# Patient Record
Sex: FEMALE | Employment: UNEMPLOYED | URBAN - METROPOLITAN AREA
[De-identification: names, ages, dates, MRNs, and addresses within clinical notes are randomized per-mention and may not be internally consistent; named-entity substitution may affect disease eponyms.]

---

## 2017-03-20 PROBLEM — F51.01 PRIMARY INSOMNIA: Status: ACTIVE | Noted: 2017-03-20

## 2017-03-20 PROBLEM — F41.9 ANXIETY: Status: ACTIVE | Noted: 2017-03-20

## 2017-03-22 PROBLEM — R73.01 IMPAIRED FASTING GLUCOSE: Status: ACTIVE | Noted: 2017-03-22

## 2017-07-07 PROBLEM — M54.50 ACUTE LEFT-SIDED LOW BACK PAIN WITHOUT SCIATICA: Status: ACTIVE | Noted: 2017-07-07

## 2017-09-15 PROBLEM — M18.0 ARTHRITIS OF CARPOMETACARPAL (CMC) JOINTS OF BOTH THUMBS: Status: ACTIVE | Noted: 2017-09-15

## 2017-11-06 PROBLEM — Z90.710 ACQUIRED ABSENCE OF BOTH CERVIX AND UTERUS: Status: ACTIVE | Noted: 2017-11-06

## 2018-02-20 PROBLEM — M54.50 ACUTE LEFT-SIDED LOW BACK PAIN WITHOUT SCIATICA: Status: RESOLVED | Noted: 2017-07-07 | Resolved: 2018-02-20

## 2018-11-15 ENCOUNTER — OFFICE VISIT (OUTPATIENT)
Dept: INTERNAL MEDICINE CLINIC | Facility: CLINIC | Age: 53
End: 2018-11-15
Payer: COMMERCIAL

## 2018-11-15 VITALS
HEIGHT: 63 IN | TEMPERATURE: 98 F | BODY MASS INDEX: 25.52 KG/M2 | DIASTOLIC BLOOD PRESSURE: 74 MMHG | WEIGHT: 144 LBS | SYSTOLIC BLOOD PRESSURE: 100 MMHG | HEART RATE: 64 BPM

## 2018-11-15 DIAGNOSIS — Z00.00 ROUTINE HEALTH MAINTENANCE: Primary | ICD-10-CM

## 2018-11-15 DIAGNOSIS — Z12.31 ENCOUNTER FOR SCREENING MAMMOGRAM FOR BREAST CANCER: ICD-10-CM

## 2018-11-15 DIAGNOSIS — E03.9 ACQUIRED HYPOTHYROIDISM: ICD-10-CM

## 2018-11-15 DIAGNOSIS — R92.2 DENSE BREASTS: ICD-10-CM

## 2018-11-15 DIAGNOSIS — R73.01 IMPAIRED FASTING GLUCOSE: ICD-10-CM

## 2018-11-15 DIAGNOSIS — Z78.0 POSTMENOPAUSE: ICD-10-CM

## 2018-11-15 PROCEDURE — 90471 IMMUNIZATION ADMIN: CPT | Performed by: INTERNAL MEDICINE

## 2018-11-15 PROCEDURE — 90686 IIV4 VACC NO PRSV 0.5 ML IM: CPT | Performed by: INTERNAL MEDICINE

## 2018-11-15 PROCEDURE — 99386 PREV VISIT NEW AGE 40-64: CPT | Performed by: INTERNAL MEDICINE

## 2018-11-15 NOTE — PROGRESS NOTES
Mine Krueger is a 46year old female. Patient presents with:  Establish Care      HPI:   Mine Krueger is a 46year old female new pt who presents for a complete physical exam.  Here to establish. Referred by her friend Dr. Mcmahon Covert.    Previo D & C  2001    missed ab   • ENDOMETRIAL ABLATION  9/30/10    Thermachoice III   • ESOPHAGOGASTRODUODENOSCOPY, COLONOSCOPY, POSSIBLE BIOPSY, POSSIBLE POLYPECTOMY 14689, 68804 N/A 12/20/2011    Performed by Andrés Gonsales MD at [address] [place] [address]   • normal oropharynx, normal TM's  EYES: PERRLA, EOMI, conjunctivae are pink  NECK: supple, no cervical or supraclavicular LAD, no carotid bruits  BREAST: no dominant or suspicious mass, no axillary LAD  LUNGS: clear to auscultation  CARDIO: RRR, normal S1S2,

## 2018-11-16 ENCOUNTER — TELEPHONE (OUTPATIENT)
Dept: INTERNAL MEDICINE CLINIC | Facility: CLINIC | Age: 53
End: 2018-11-16

## 2018-11-16 DIAGNOSIS — Z00.00 ROUTINE ADULT HEALTH MAINTENANCE: Primary | ICD-10-CM

## 2018-11-16 NOTE — TELEPHONE ENCOUNTER
Orders entered per Dr. Guanaco Pitts. Left voicemail relaying the message along with instructions to fast for 12 hours prior to blood draw. Encouraged the patient to call back with any questions or concerns. Nothing further at this time.

## 2018-11-16 NOTE — TELEPHONE ENCOUNTER
Pt is calling she saw Dr Maira Diamond yesterday, she was told Dr Maira Diamond would order lab work nothing has been ordered yet  Please call pt when complete [phone]    Tasked to nursing

## 2018-11-16 NOTE — TELEPHONE ENCOUNTER
Per Dr. Barajas Ask 11/15/18 office visit note:  Routine health maintenance  Pt is s/p HAYDEN/BSO. Mammo normal 11/2017; Rx done for repeat mammo. Check DEXA. Colonoscopy normal 12/20/11 (Dr. Jassi Merino); next in 2021. Rec Shingrix shingles vaccine.  Tdap d

## 2018-11-19 ENCOUNTER — APPOINTMENT (OUTPATIENT)
Dept: LAB | Age: 53
End: 2018-11-19
Attending: INTERNAL MEDICINE
Payer: COMMERCIAL

## 2018-11-19 DIAGNOSIS — Z00.00 ROUTINE ADULT HEALTH MAINTENANCE: ICD-10-CM

## 2018-11-19 PROCEDURE — 82947 ASSAY GLUCOSE BLOOD QUANT: CPT

## 2018-11-19 PROCEDURE — 36415 COLL VENOUS BLD VENIPUNCTURE: CPT

## 2018-11-19 PROCEDURE — 80061 LIPID PANEL: CPT

## 2018-11-19 PROCEDURE — 83036 HEMOGLOBIN GLYCOSYLATED A1C: CPT

## 2018-11-19 NOTE — TELEPHONE ENCOUNTER
Pt does not required a prior auth from [facility] for mammogram. Pt only requires an order.

## 2018-11-24 ENCOUNTER — HOSPITAL ENCOUNTER (OUTPATIENT)
Dept: BONE DENSITY | Age: 53
Discharge: HOME | End: 2018-11-24
Attending: INTERNAL MEDICINE
Payer: COMMERCIAL

## 2018-11-24 ENCOUNTER — HOSPITAL ENCOUNTER (OUTPATIENT)
Dept: MAMMOGRAPHY | Age: 53
Discharge: HOME | End: 2018-11-24
Attending: INTERNAL MEDICINE
Payer: COMMERCIAL

## 2018-11-24 DIAGNOSIS — R92.2 DENSE BREASTS: ICD-10-CM

## 2018-11-24 DIAGNOSIS — Z78.0 POSTMENOPAUSE: ICD-10-CM

## 2018-11-24 DIAGNOSIS — Z12.31 ENCOUNTER FOR SCREENING MAMMOGRAM FOR BREAST CANCER: ICD-10-CM

## 2018-11-24 PROCEDURE — 77063 BREAST TOMOSYNTHESIS BI: CPT | Performed by: INTERNAL MEDICINE

## 2018-11-24 PROCEDURE — 77080 DXA BONE DENSITY AXIAL: CPT | Performed by: INTERNAL MEDICINE

## 2018-11-24 PROCEDURE — 77067 SCR MAMMO BI INCL CAD: CPT | Performed by: INTERNAL MEDICINE

## 2018-11-27 ENCOUNTER — TELEPHONE (OUTPATIENT)
Dept: INTERNAL MEDICINE CLINIC | Facility: CLINIC | Age: 53
End: 2018-11-27

## 2018-11-27 NOTE — TELEPHONE ENCOUNTER
Pt callling to discuss lab results from 11/19, pt is unsure how to read the results from my chart  Please call [phone]    Tasked to nursing

## 2018-11-28 NOTE — TELEPHONE ENCOUNTER
Please advise - DR. COLLINS - patient called who states she had TSH done a couple months ago - ( DR. Nunez Serve endocrinologist) She will let them know to fax results over - fax number given to patient.  Patient  Wants to lose weight  Nothing  is working , she is exer

## 2018-11-28 NOTE — TELEPHONE ENCOUNTER
Lipid profile is normal.  LDL is NOT high (normal is actually <130). HgbA1c is normal, no diabetes. TSH was not done even though I am looking at the order in [address] [place] [address] Rd. This was an oops by the lab. Can pt pretty please get it done?   Please call the lab and

## 2018-11-28 NOTE — TELEPHONE ENCOUNTER
Called patient and relayed DR. COLLINS message - verbalized understanding. Address and number for DR. Torres given to patient

## 2019-01-22 ENCOUNTER — TELEPHONE (OUTPATIENT)
Dept: INTERNAL MEDICINE CLINIC | Facility: CLINIC | Age: 54
End: 2019-01-22

## 2019-01-22 NOTE — TELEPHONE ENCOUNTER
To Dr. Sandie Almodovar - fell on Sunday, landed on R elbow which is bruised, head fell back hit driveway - pt states she just felt woozy afterward,  Denies headache, nausea, blurred vision, dizziness.   Advised ER for CT scan of head for possible bleed - pt would

## 2019-01-22 NOTE — TELEPHONE ENCOUNTER
It really depends on how she feels. She is not on any blood thinners. If she has no sx of dizziness, visual changes, nausea or headache currently, then I think it's okay to hold off on CT.   If she does have any of the those sx, then yes I advise ER for C

## 2019-01-22 NOTE — TELEPHONE ENCOUNTER
Slipped on the ice over the weekend and hit her head   Would like call back from nursing to advise [phone]

## 2019-01-22 NOTE — TELEPHONE ENCOUNTER
Dr. Sandie Almodovar' message relayed to pt who verbalized understanding. Pt will self monitor, if she notices any changes she will go to ER.

## 2019-02-06 NOTE — TELEPHONE ENCOUNTER
I would at least like the CT report. She may have post-concussion syndrome. Have her see me next week if not better.

## 2019-02-06 NOTE — TELEPHONE ENCOUNTER
S/w patient who state initially she did not have a headache post fall. Just had some soreness to head. Yesterday she developed a dull headache to top RT side of the head. It lasted a couple seconds. Today she developed the same headache.  Denies vomiting, n

## 2019-02-06 NOTE — TELEPHONE ENCOUNTER
Pt called as follow up to fall  Yesterday pt felt some pain and on head and then happened again today  Tasked to nursing

## 2019-02-06 NOTE — TELEPHONE ENCOUNTER
Patient called back. She wants to know if she should be following any restrictions. Is it ok for her to work out like normal? Drive? Do her chores?   She has been taking motrin prn for pain    To Dr Susu Ocampo

## 2019-02-06 NOTE — TELEPHONE ENCOUNTER
Patient called back. She did not listen to the message yet. I relayed Dr Casey Quezada message. She verbalized understanding.

## 2019-02-06 NOTE — TELEPHONE ENCOUNTER
As FYI to DR. COLLINS - called patient who will have report of CT ( done at [facility]) faxed over , Relayed rest of DR. COLLINS message and she verbalized understanding

## 2019-02-06 NOTE — TELEPHONE ENCOUNTER
Called patient and relayed Dr Rae Martinez message on VM per hipaa. I did ask that patient call back to confirm she received the message.

## 2019-02-06 NOTE — TELEPHONE ENCOUNTER
Pt calling back she had the CT scan done results came out to be normal asking if she need the results and also if a follow up is needed when does she need to come in.

## 2019-02-07 NOTE — TELEPHONE ENCOUNTER
Pt called to notify office that CT results were faxed to office  Results rec'd and placed in Dr Laly Cotto in-box  Please call pt with any questions  Tasked to nursing

## 2019-02-07 NOTE — TELEPHONE ENCOUNTER
Reviewed CT results. Reports states, \"Impression: No acute intracranial abnormalities are demonstrated. \"    To Dr. Kathrin Magallanes. Report in your inbox on your desk.

## 2019-02-13 NOTE — TELEPHONE ENCOUNTER
To Dr. Cally Lebron, your message relayed, pt feeling a bit better. Pt concerned - when is she clear, out of the woods? Looked on-line which stated sx can persist and worsen over time.

## 2019-05-06 ENCOUNTER — TELEPHONE (OUTPATIENT)
Dept: INTERNAL MEDICINE CLINIC | Facility: CLINIC | Age: 54
End: 2019-05-06

## 2019-05-06 DIAGNOSIS — Z01.84 IMMUNITY STATUS TESTING: Primary | ICD-10-CM

## 2019-05-06 NOTE — TELEPHONE ENCOUNTER
Pt is calling with requesting a call back from a nurse, pt is wondering if she needs a booster shot. Pt is not sure when she had her last one, pt came from the [facility] when she was 8years old.     Best call back: [phone]

## 2019-05-06 NOTE — TELEPHONE ENCOUNTER
To Dr. Dominick Dempsey - pt asking if she needs MMR booster since is unable to remember if she had both MMR shots. Pt came here in [address] when she was 6 and entered school here.   If MMR needed pt will need order - her insurance wants her to have immunization at lo

## 2019-05-08 NOTE — TELEPHONE ENCOUNTER
Pt thanks us for the call. If she can not recall the date he will have titer drawn. Prior to this she would also like to call [facility] to see where she can draw labs. Pt will call back when ready.

## 2019-05-09 ENCOUNTER — TELEPHONE (OUTPATIENT)
Dept: INTERNAL MEDICINE CLINIC | Facility: CLINIC | Age: 54
End: 2019-05-09

## 2019-05-09 NOTE — TELEPHONE ENCOUNTER
Spoke with patient. She reports intermittent tingling to L hand and L foot that started about 2 weeks ago. She mostly notices the tingling when she is lying in bed, but not when she is up and about.  She describes the tingling sensation stating, \"It feels

## 2019-05-09 NOTE — TELEPHONE ENCOUNTER
Needs recommendation for tingling feeling she has been having in her left hand/& foot  Mostly happens when she is lying down    Requests call back from RN to advise [phone]  - pt will available for call back  this morning after 10:30 am

## 2019-05-09 NOTE — TELEPHONE ENCOUNTER
Spoke with patient today (see other task), and she brought up the titers subject again.  Patient is wondering if it would be better for her to just have the MMR vaccine, rather than being charged for the titers and then possibly being charged for the vaccin

## 2019-05-09 NOTE — TELEPHONE ENCOUNTER
Dr. Yony Cast provided the following recommendation:  1. You may order titer, and if susceptible (not immune) provide 1 MMR booster. 2.       If patient refuses titer, provide 1 MMR booster.     Dr. Yony Cast felt trying to simplify the recommendation Unknown if ever smoked

## 2019-05-14 NOTE — TELEPHONE ENCOUNTER
To FD---pls call pt ;   Let her know the codes I gave her are what I have available;    We would be happy to have our , Vern Hubbard call her when she returns to the office,   Then route to Vern Hubbard if needed

## 2019-05-14 NOTE — TELEPHONE ENCOUNTER
Pt calls back, states she received [employer] message but is still not sure if she needs the booster or not. Would like a call back.

## 2019-05-14 NOTE — TELEPHONE ENCOUNTER
Pt is calling back she called billing and was told he needs the exact wording of the titer and vaccine she will be getting  She was told there are 12 different ones, pt is frustrated   please call pt [phone]    Tasked to nursing

## 2019-05-14 NOTE — TELEPHONE ENCOUNTER
Reviewed Matilda's message with pt as well as side effects of MMR. Pt will call billing to check price of labs. She will call back if needed.

## 2019-05-23 ENCOUNTER — OFFICE VISIT (OUTPATIENT)
Dept: INTERNAL MEDICINE CLINIC | Facility: CLINIC | Age: 54
End: 2019-05-23
Payer: COMMERCIAL

## 2019-05-23 VITALS
TEMPERATURE: 98 F | DIASTOLIC BLOOD PRESSURE: 70 MMHG | WEIGHT: 148 LBS | BODY MASS INDEX: 26.22 KG/M2 | HEIGHT: 63 IN | SYSTOLIC BLOOD PRESSURE: 104 MMHG | HEART RATE: 76 BPM

## 2019-05-23 DIAGNOSIS — N89.8 VAGINAL DISCHARGE: ICD-10-CM

## 2019-05-23 DIAGNOSIS — R20.2 PARESTHESIA: Primary | ICD-10-CM

## 2019-05-23 PROCEDURE — 99212 OFFICE O/P EST SF 10 MIN: CPT | Performed by: INTERNAL MEDICINE

## 2019-05-23 PROCEDURE — 99214 OFFICE O/P EST MOD 30 MIN: CPT | Performed by: INTERNAL MEDICINE

## 2019-05-23 RX ORDER — METRONIDAZOLE 500 MG/1
500 TABLET ORAL 2 TIMES DAILY
Qty: 14 TABLET | Refills: 0 | Status: SHIPPED | OUTPATIENT
Start: 2019-05-23 | End: 2019-06-21 | Stop reason: ALTCHOICE

## 2019-05-23 NOTE — PROGRESS NOTES
Darion Villalpando is a 53year old female. Patient presents with:  Checkup: tingling in extremities also possible vaginal infection    HPI:       Pt notes a fishy vaginal odor for the past 3-4 days. No itching, pain, or discharge. No recent abx.   Has (1.6 m)   Wt 148 lb (67.1 kg)   LMP 10/11/2010   BMI 26.22 kg/m²   GENERAL: well developed, well nourished, in no apparent distress  NEURO: normal sensation of left hand, foot. Normal  strength. +2 radial and DP pulses.   No tenderness or paresthesias

## 2019-05-27 ENCOUNTER — TELEPHONE (OUTPATIENT)
Dept: INTERNAL MEDICINE CLINIC | Facility: CLINIC | Age: 54
End: 2019-05-27

## 2019-05-27 NOTE — TELEPHONE ENCOUNTER
Vitamin B12 level completely normal.  Is she still having the tingling in her hand and foot?   If so, recommend additional labs, possibly EMG

## 2019-05-28 NOTE — TELEPHONE ENCOUNTER
LMTCB.     When patient calls back please relay MD message below and also remind patient to call back if any tingling in the hands or feet reoccurs. Thanks!

## 2019-05-28 NOTE — TELEPHONE ENCOUNTER
If the vaginal sx recur, I recommend seeing gyne, as they can do further testing (we do not have that available in our office)

## 2019-05-28 NOTE — TELEPHONE ENCOUNTER
Spoke to patient and relayed MD message, patient verbalizes understanding. Patient reports that she has not noticed the tingling in her hand and foot for the \"last day or so\". She thinks that it may be getting better since she was last seen.  Advised

## 2019-06-21 ENCOUNTER — OFFICE VISIT (OUTPATIENT)
Dept: INTERNAL MEDICINE CLINIC | Facility: CLINIC | Age: 54
End: 2019-06-21
Payer: COMMERCIAL

## 2019-06-21 VITALS
SYSTOLIC BLOOD PRESSURE: 116 MMHG | OXYGEN SATURATION: 99 % | DIASTOLIC BLOOD PRESSURE: 86 MMHG | WEIGHT: 146 LBS | RESPIRATION RATE: 12 BRPM | HEART RATE: 84 BPM | TEMPERATURE: 98 F | BODY MASS INDEX: 26 KG/M2

## 2019-06-21 DIAGNOSIS — N75.0 BARTHOLIN GLAND CYST: Primary | ICD-10-CM

## 2019-06-21 PROCEDURE — 99213 OFFICE O/P EST LOW 20 MIN: CPT | Performed by: INTERNAL MEDICINE

## 2019-06-21 PROCEDURE — 99212 OFFICE O/P EST SF 10 MIN: CPT | Performed by: INTERNAL MEDICINE

## 2019-06-21 NOTE — PROGRESS NOTES
Radha Escamilla is a 53year old female. Patient presents with:  Bump: Pt reports bump in her genital area since this AM      HPI:   Radha Escamilla is a 53year old female who presents for: bump on vaginal    Noticed bump on vagina this mor Thermachoice III Endometrial Ablation   • LEEP  1997   • OTHER SURGICAL HISTORY  1998    lapartomy for fibroids, she states\"lots of adhesions like PID\", one tube ligated & other one unable, Dr was Dr Pabol Bowman at [facility]

## 2019-07-09 ENCOUNTER — TELEPHONE (OUTPATIENT)
Dept: INTERNAL MEDICINE CLINIC | Facility: CLINIC | Age: 54
End: 2019-07-09

## 2019-07-09 NOTE — TELEPHONE ENCOUNTER
Pt has had puffiness on the outer part of her left knee, it is hard to bend sometimes and not as stable  She has been trying to do some strengthening exercises  Pt is asking for suggestions she can try before she comes in [phone]  Tasked to nursing

## 2019-07-09 NOTE — TELEPHONE ENCOUNTER
Spoke with patient. She reports 2-3 mos ago, she had a free PT assessment done at her gym. She was told by PT that her L knee was much weaker than the R. She was given strength exercises for L leg/gluts and has been doing these.  She feels this weakness may

## 2019-07-10 NOTE — TELEPHONE ENCOUNTER
Left detailed message (ok per privacy) relaying MD recommendation below. Provided pt with Dr. Imer Pinedo contact information I:[phone]. Advised pt to call back if she has any q's.

## 2019-08-22 ENCOUNTER — TELEPHONE (OUTPATIENT)
Dept: INTERNAL MEDICINE CLINIC | Facility: CLINIC | Age: 54
End: 2019-08-22

## 2019-08-22 NOTE — TELEPHONE ENCOUNTER
Please call pt, voice gets hoarse off and on all day for the last two days  Throat is not sore  Does pt need to be seen?   Tasked to nursing

## 2019-08-22 NOTE — TELEPHONE ENCOUNTER
Agree -- this could be the beginning of a viral URI (which can take 10-14 days to resolve) or allergies. Neither would require antibiotics. Would just manage the symptoms conservatively, push fluids.

## 2019-08-22 NOTE — TELEPHONE ENCOUNTER
Spoke with patient. Yesterday she woke up with a hoarse voice which seemed to go away. Now again today it is hoarse. She denies cough or congestion. No sore throat. No fever or chills. Patient states she is very tired and under a lot of stress.     We

## 2019-10-23 ENCOUNTER — NURSE ONLY (OUTPATIENT)
Dept: INTERNAL MEDICINE CLINIC | Facility: CLINIC | Age: 54
End: 2019-10-23
Payer: COMMERCIAL

## 2019-10-23 DIAGNOSIS — Z23 NEED FOR INFLUENZA VACCINATION: Primary | ICD-10-CM

## 2019-10-23 NOTE — PROGRESS NOTES
Verified name and      Administered Flu Vaccine    Pt tolerated injection well    Consent signed    VIS provided

## 2019-11-19 ENCOUNTER — OFFICE VISIT (OUTPATIENT)
Dept: INTERNAL MEDICINE CLINIC | Facility: CLINIC | Age: 54
End: 2019-11-19
Payer: COMMERCIAL

## 2019-11-19 VITALS
HEART RATE: 62 BPM | OXYGEN SATURATION: 98 % | SYSTOLIC BLOOD PRESSURE: 110 MMHG | DIASTOLIC BLOOD PRESSURE: 86 MMHG | WEIGHT: 151 LBS | BODY MASS INDEX: 26 KG/M2 | TEMPERATURE: 98 F

## 2019-11-19 DIAGNOSIS — R73.01 IMPAIRED FASTING GLUCOSE: ICD-10-CM

## 2019-11-19 DIAGNOSIS — Z12.31 ENCOUNTER FOR SCREENING MAMMOGRAM FOR BREAST CANCER: ICD-10-CM

## 2019-11-19 DIAGNOSIS — R92.2 DENSE BREASTS: ICD-10-CM

## 2019-11-19 DIAGNOSIS — E03.9 ACQUIRED HYPOTHYROIDISM: ICD-10-CM

## 2019-11-19 DIAGNOSIS — Z00.00 ROUTINE ADULT HEALTH MAINTENANCE: Primary | ICD-10-CM

## 2019-11-19 DIAGNOSIS — E78.00 ELEVATED CHOLESTEROL: ICD-10-CM

## 2019-11-19 DIAGNOSIS — Z00.00 ROUTINE HEALTH MAINTENANCE: ICD-10-CM

## 2019-11-19 DIAGNOSIS — R53.83 OTHER FATIGUE: ICD-10-CM

## 2019-11-19 PROCEDURE — 99396 PREV VISIT EST AGE 40-64: CPT | Performed by: INTERNAL MEDICINE

## 2019-11-20 NOTE — PROGRESS NOTES
Flip Born is a 53year old female. Patient presents with:  Physical: Lab and Mammo pended   Knee Pain: Pt seen Dr. Markos Johnson had Xray - needs MRI L knee       HPI:   Flip Born is a 53year old female new pt who presents for a complete physica Procedure Laterality Date   •      •  DELIVERY ONLY      x 1   • COLONOSCOPY  -    Negative with Dr. Shannan Perez.   Repeat    • D & C      missed ab   • ENDOMETRIAL ABLATION  9/30/10    Thermachoice III   • ESOPHAGOGASTRODUODEN Position: Sitting, Cuff Size: adult)   Pulse 62   Temp 98 °F (36.7 °C) (Oral)   Wt 151 lb (68.5 kg)   LMP 10/11/2010   SpO2 98%   BMI 26.33 kg/m²     GENERAL: well developed, well nourished, in no apparent distress  HEENT: normal oropharynx, normal TM's  E

## 2019-11-25 ENCOUNTER — TELEPHONE (OUTPATIENT)
Dept: INTERNAL MEDICINE CLINIC | Facility: CLINIC | Age: 54
End: 2019-11-25

## 2019-11-25 NOTE — TELEPHONE ENCOUNTER
Received lab results via [email or web address]  Requests call back tomorrow re: results  - has concerns re: cholesterol results    [phone]

## 2019-11-26 NOTE — TELEPHONE ENCOUNTER
Labs normal.  LDL of 127 is normal (normal range for LDL is actually <130, not <100 -- that is for diabetics)

## 2020-04-23 ENCOUNTER — TELEPHONE (OUTPATIENT)
Dept: INTERNAL MEDICINE CLINIC | Facility: CLINIC | Age: 55
End: 2020-04-23

## 2020-04-23 NOTE — TELEPHONE ENCOUNTER
Pt started with cold sweets about 9 days ago no fever some cough that comes and goes. Had a headache for a short time no body aches.  Please advise

## 2020-04-23 NOTE — TELEPHONE ENCOUNTER
Could be any virus but cannot r/o Covid. Recommend self-quarantine until 3 days after complete resolution of symptoms. Tylenol, mucinex DM for symptom control.

## 2020-04-23 NOTE — TELEPHONE ENCOUNTER
Respiratory infection triage:    Fever:  [x]  No fever  []  Fever>100.4    Cough:  [] Tight cough  [] Cough with exertion  [x] Dry cough  [] Sputum production, Color    Breathing:  [] Mild shortness of breath interfering with activity  [] Wheezing  [] Pain

## 2020-04-23 NOTE — TELEPHONE ENCOUNTER
Spoke with patient and relayed Dr Clau Knott message to her. She verbalized understanding. Advised she continue to monitor her symptoms, call back if she develops fever or worsening of symptoms.   Discussed staying away from others, wear mask

## 2020-10-29 ENCOUNTER — TELEPHONE (OUTPATIENT)
Dept: INTERNAL MEDICINE CLINIC | Facility: CLINIC | Age: 55
End: 2020-10-29

## 2020-10-29 DIAGNOSIS — Z12.39 ENCOUNTER FOR SCREENING FOR MALIGNANT NEOPLASM OF BREAST, UNSPECIFIED SCREENING MODALITY: Primary | ICD-10-CM

## 2020-10-29 DIAGNOSIS — R92.2 DENSE BREASTS: ICD-10-CM

## 2020-10-29 NOTE — TELEPHONE ENCOUNTER
Bilateral screening mammogram ordered, message relayed on pt's VM, per HIPAA, with Central Scheduling phone number.

## 2020-10-29 NOTE — TELEPHONE ENCOUNTER
Pt asking that mammogram be sent to [address] [place] [address], this is where her insurance will cover mammogram  Please fax order to  FAX#

## 2020-10-29 NOTE — TELEPHONE ENCOUNTER
Pt called to request order for mammogram    Has an appt for her physical on Nov 11 w. Dr Dino Durand and would like to have mammogram prior     Please call to confirm [phone]

## 2020-11-02 ENCOUNTER — TELEPHONE (OUTPATIENT)
Dept: INTERNAL MEDICINE CLINIC | Facility: CLINIC | Age: 55
End: 2020-11-02

## 2020-11-02 NOTE — TELEPHONE ENCOUNTER
Stephanie Owusu asking Dr. Bobbi Finley to order her her annual screening mammogram.  She is asking to have the order placed today. She is asking to have it faxed to [address] [place] [address] at fax# [phone]. Please let pt.  Know when order has been placed at [phone]

## 2020-11-02 NOTE — TELEPHONE ENCOUNTER
Order already placed by MD. Order faxed to number below, confirmation received. LM on patient's cell (OK per HIPPA) notifying her this has been completed.

## 2020-11-11 ENCOUNTER — OFFICE VISIT (OUTPATIENT)
Dept: INTERNAL MEDICINE CLINIC | Facility: CLINIC | Age: 55
End: 2020-11-11
Payer: COMMERCIAL

## 2020-11-11 VITALS
WEIGHT: 156 LBS | HEART RATE: 60 BPM | HEIGHT: 63.5 IN | SYSTOLIC BLOOD PRESSURE: 102 MMHG | TEMPERATURE: 98 F | DIASTOLIC BLOOD PRESSURE: 62 MMHG | BODY MASS INDEX: 27.3 KG/M2

## 2020-11-11 DIAGNOSIS — E03.9 ACQUIRED HYPOTHYROIDISM: Primary | ICD-10-CM

## 2020-11-11 DIAGNOSIS — Z00.00 ROUTINE HEALTH MAINTENANCE: ICD-10-CM

## 2020-11-11 DIAGNOSIS — R73.01 IMPAIRED FASTING GLUCOSE: ICD-10-CM

## 2020-11-11 DIAGNOSIS — R53.83 OTHER FATIGUE: ICD-10-CM

## 2020-11-11 DIAGNOSIS — M85.80 OSTEOPENIA, UNSPECIFIED LOCATION: ICD-10-CM

## 2020-11-11 PROCEDURE — 3008F BODY MASS INDEX DOCD: CPT | Performed by: INTERNAL MEDICINE

## 2020-11-11 PROCEDURE — 99396 PREV VISIT EST AGE 40-64: CPT | Performed by: INTERNAL MEDICINE

## 2020-11-11 PROCEDURE — 3078F DIAST BP <80 MM HG: CPT | Performed by: INTERNAL MEDICINE

## 2020-11-11 PROCEDURE — 99072 ADDL SUPL MATRL&STAF TM PHE: CPT | Performed by: INTERNAL MEDICINE

## 2020-11-11 PROCEDURE — 3074F SYST BP LT 130 MM HG: CPT | Performed by: INTERNAL MEDICINE

## 2020-11-11 RX ORDER — LEVOTHYROXINE SODIUM 50 UG/1
CAPSULE ORAL
COMMUNITY

## 2020-11-11 NOTE — PROGRESS NOTES
Cami Perrin is a 63year old female. Patient presents with:  Physical: Patient is here today for a PE. She states that she has been feeling good lately. No major issues at this time. She was told by ortho that she has a torn meniscus.  She does not want • Arthritis of right hand    • CERVICAL DYSPLASIA     cryo   • Elevated cholesterol 3/21/2013   • Hx of diseases NEC      sAB3   • HYPOTHYROIDISM     Dr. Saranya Mejia   • Impaired fasting glucose 3/22/2017   • Iron deficiency anemia 2010    GI workup otherwise  SKIN: denies any unusual skin lesions  EYES:denies blurred vision or double vision  HEENT: denies nasal congestion, sinus pain or ST  LUNGS: denies shortness of breath with exertion or cough  CARDIOVASCULAR: denies chest pain, pressure, or palpi

## 2020-12-03 ENCOUNTER — TELEPHONE (OUTPATIENT)
Dept: INTERNAL MEDICINE CLINIC | Facility: CLINIC | Age: 55
End: 2020-12-03

## 2020-12-03 DIAGNOSIS — R73.03 PREDIABETES: Primary | ICD-10-CM

## 2020-12-03 DIAGNOSIS — E78.00 HYPERCHOLESTEROLEMIA: ICD-10-CM

## 2020-12-03 DIAGNOSIS — M85.88 OSTEOPENIA OF LUMBAR SPINE: ICD-10-CM

## 2020-12-03 RX ORDER — VITAMIN B COMPLEX
1000 TABLET ORAL DAILY
Qty: 1 TABLET | Refills: 0 | COMMUNITY
Start: 2020-12-03 | End: 2020-12-09

## 2020-12-03 RX ORDER — ALENDRONATE SODIUM 70 MG/1
70 TABLET ORAL WEEKLY
Qty: 13 TABLET | Refills: 3 | Status: CANCELLED | OUTPATIENT
Start: 2020-12-03

## 2020-12-04 NOTE — TELEPHONE ENCOUNTER
I spoke with patient and relayed Dr. Dipika Fonseca message. She verbalized understanding. She would prefer to speak with Dr. Laly Cotto next week about the Fosamax. This is still pended. Phone visit scheduled for next Wednesday.  Patient says she has vitamin D a

## 2020-12-04 NOTE — TELEPHONE ENCOUNTER
Please call pt with DEXA results. She has severe osteopenia of the spine --appears to be worse than the previous DEXA although because it was at a different facility, it is difficult to compare results due to different machines.     Given that she is right

## 2020-12-09 ENCOUNTER — VIRTUAL PHONE E/M (OUTPATIENT)
Dept: INTERNAL MEDICINE CLINIC | Facility: CLINIC | Age: 55
End: 2020-12-09
Payer: COMMERCIAL

## 2020-12-09 DIAGNOSIS — M85.88 OSTEOPENIA OF LUMBAR SPINE: Primary | ICD-10-CM

## 2020-12-09 DIAGNOSIS — R73.01 IMPAIRED FASTING GLUCOSE: ICD-10-CM

## 2020-12-09 DIAGNOSIS — R73.03 PREDIABETES: ICD-10-CM

## 2020-12-09 DIAGNOSIS — E78.00 HYPERCHOLESTEROLEMIA: ICD-10-CM

## 2020-12-09 PROCEDURE — 99214 OFFICE O/P EST MOD 30 MIN: CPT | Performed by: INTERNAL MEDICINE

## 2020-12-09 RX ORDER — CHOLECALCIFEROL (VITAMIN D3) 125 MCG
5000 CAPSULE ORAL DAILY
Qty: 1 CAPSULE | Refills: 0 | COMMUNITY
Start: 2020-12-09 | End: 2020-12-10

## 2020-12-09 RX ORDER — ALENDRONATE SODIUM 70 MG/1
70 TABLET ORAL WEEKLY
Qty: 13 TABLET | Refills: 3 | Status: SHIPPED | OUTPATIENT
Start: 2020-12-09 | End: 2021-10-01

## 2020-12-09 NOTE — PROGRESS NOTES
Virtual Telephone Check-In    Sarita Mikel Davis verbally consents to    a Virtual/Telephone Check-In visit on 12/09/20. Patient has been referred to the [facility] website at [email or web address] to review the yearly Consent to Treat document.     Patient und

## 2020-12-11 ENCOUNTER — TELEPHONE (OUTPATIENT)
Dept: INTERNAL MEDICINE CLINIC | Facility: CLINIC | Age: 55
End: 2020-12-11

## 2020-12-11 NOTE — TELEPHONE ENCOUNTER
Would collagen powder be good to take for bone density? [facility] liver oil for cholesterol?     Requests call back with Dr Alison Fair' opinion on this    [phone]    - pt knows Dr Corona Manual is not in today/ok for next week

## 2020-12-14 NOTE — TELEPHONE ENCOUNTER
LM on patient's cell (OK per HIPAA) relaying MD message. Advised in VM to call back with any questions or concerns.

## 2021-01-04 ENCOUNTER — TELEPHONE (OUTPATIENT)
Dept: INTERNAL MEDICINE CLINIC | Facility: CLINIC | Age: 56
End: 2021-01-04

## 2021-01-04 RX ORDER — CHOLECALCIFEROL (VITAMIN D3) 125 MCG
5000 CAPSULE ORAL DAILY
Qty: 1 CAPSULE | Refills: 0 | Status: CANCELLED | OUTPATIENT
Start: 2021-01-04 | End: 2021-01-05

## 2021-01-04 NOTE — TELEPHONE ENCOUNTER
Patient is calling in regards to her Vitamin D medication. Medication was last sent to the [facility] on 12/9/2020.  Patient went to [facility] to  the medication and they informed her that they did not receive the refill re

## 2021-01-05 RX ORDER — ERGOCALCIFEROL 1.25 MG/1
50000 CAPSULE ORAL WEEKLY
Qty: 12 CAPSULE | Refills: 3 | Status: SHIPPED | OUTPATIENT
Start: 2021-01-05 | End: 2021-10-01

## 2021-01-05 NOTE — TELEPHONE ENCOUNTER
Spoke to patient and relayed MD message. Patient verbalized understanding. Patient states she would like to be Rx'ed Vitamin D 50,000 units weekly preferably- states if taking tablet less often is possible she would prefer this option.  Rx pended for MD t

## 2021-01-19 ENCOUNTER — TELEPHONE (OUTPATIENT)
Dept: INTERNAL MEDICINE CLINIC | Facility: CLINIC | Age: 56
End: 2021-01-19

## 2021-01-19 NOTE — TELEPHONE ENCOUNTER
11/28/20 lab results and 11/25/20 dexascan results faxed to Dr. Nawaf Miller: [phone], fax confirmation received.

## 2021-01-19 NOTE — TELEPHONE ENCOUNTER
Pt called to request her Labs & dexa scan results are sent to Dr Sandy Gonzalez, endocrinologist, fax# [phone]

## 2021-02-25 ENCOUNTER — TELEPHONE (OUTPATIENT)
Dept: INTERNAL MEDICINE CLINIC | Facility: CLINIC | Age: 56
End: 2021-02-25

## 2021-02-25 NOTE — TELEPHONE ENCOUNTER
Please call pt   Concerned she injured her shoulder in yoga with weights class  Should pt be seen?   Tasked to nursing

## 2021-03-05 NOTE — TELEPHONE ENCOUNTER
Called patient to follow up on issue from last week. She does not feel need to come in to office at this time. She thinks the pain was due to her \"msucles changing\"  Pain is better. She will call back if anything changes.   Denies complaints at this ronda

## 2021-03-26 ENCOUNTER — IMMUNIZATION (OUTPATIENT)
Dept: LAB | Age: 56
End: 2021-03-26

## 2021-03-26 DIAGNOSIS — Z23 NEED FOR VACCINATION: Primary | ICD-10-CM

## 2021-03-26 PROCEDURE — 91301 COVID-19 MODERNA VACCINE: CPT

## 2021-03-26 PROCEDURE — 0011A COVID-19 MODERNA VACCINE: CPT

## 2021-04-06 NOTE — TELEPHONE ENCOUNTER
To Dr. Valencia Venegas - see all below. Pt continues to do yoga but without wts. R arm/bicep decreased ROM - \"when bending back I can hear something pop/snap\". Pt prefers appt after 5 but there are no openings at all with you this week.

## 2021-04-06 NOTE — TELEPHONE ENCOUNTER
Patient is calling she is still experiencing pain in her arm    She said it now seems to be in her left Bicep  She doesn't have a range of motion and feels pain when she moves that arm      Please call patient [phone]

## 2021-04-06 NOTE — TELEPHONE ENCOUNTER
Dr. Glen Burns' message relayed to pt who verbalized understanding. Appt made for next Tuesday at [facility] per pt.

## 2021-04-13 ENCOUNTER — TELEPHONE (OUTPATIENT)
Dept: INTERNAL MEDICINE CLINIC | Facility: CLINIC | Age: 56
End: 2021-04-13

## 2021-04-13 ENCOUNTER — OFFICE VISIT (OUTPATIENT)
Dept: INTERNAL MEDICINE CLINIC | Facility: CLINIC | Age: 56
End: 2021-04-13
Payer: COMMERCIAL

## 2021-04-13 VITALS
BODY MASS INDEX: 26.57 KG/M2 | OXYGEN SATURATION: 96 % | HEART RATE: 65 BPM | WEIGHT: 151.81 LBS | HEIGHT: 63.5 IN | SYSTOLIC BLOOD PRESSURE: 128 MMHG | TEMPERATURE: 98 F | DIASTOLIC BLOOD PRESSURE: 80 MMHG

## 2021-04-13 DIAGNOSIS — M75.41 IMPINGEMENT SYNDROME OF RIGHT SHOULDER: Primary | ICD-10-CM

## 2021-04-13 PROCEDURE — 99213 OFFICE O/P EST LOW 20 MIN: CPT | Performed by: INTERNAL MEDICINE

## 2021-04-13 PROCEDURE — 3079F DIAST BP 80-89 MM HG: CPT | Performed by: INTERNAL MEDICINE

## 2021-04-13 PROCEDURE — 3008F BODY MASS INDEX DOCD: CPT | Performed by: INTERNAL MEDICINE

## 2021-04-13 PROCEDURE — 3074F SYST BP LT 130 MM HG: CPT | Performed by: INTERNAL MEDICINE

## 2021-04-13 RX ORDER — MELATONIN
NIGHTLY
COMMUNITY

## 2021-04-13 RX ORDER — MELOXICAM 7.5 MG/1
7.5 TABLET ORAL DAILY
Qty: 30 TABLET | Refills: 1 | Status: SHIPPED | OUTPATIENT
Start: 2021-04-13 | End: 2021-06-15

## 2021-04-13 NOTE — TELEPHONE ENCOUNTER
Pt was seen earlier today and discussed an anti-inflammatory with Dr Shahana Urrutia asked that this be called in for her to [address]  Please call pt with any questions  Tasked to Dr Derrick Gamez

## 2021-04-13 NOTE — PROGRESS NOTES
Yasmine Moses is a 61year old female. Patient presents with:  Arm Pain: Onset: a couple weeks. c/o RT arm pain (bicep area into shoulder). Limited ROM. saw physical therapy who thinks she has tendinitis. Requesting PT referral for therapy.      HPI: Smoker      Smokeless tobacco: Never Used    Alcohol use:  Yes      Alcohol/week: 0.0 standard drinks      Comment: Rare    Drug use: No       REVIEW OF SYSTEMS:   GENERAL HEALTH: feels well otherwise  RESPIRATORY: no SOB  CARDIOVASCULAR: no chest pain/pres

## 2021-04-25 ENCOUNTER — APPOINTMENT (OUTPATIENT)
Dept: LAB | Age: 56
End: 2021-04-25
Attending: HOSPITALIST

## 2021-04-26 ENCOUNTER — IMMUNIZATION (OUTPATIENT)
Dept: LAB | Age: 56
End: 2021-04-26

## 2021-04-26 DIAGNOSIS — Z23 NEED FOR VACCINATION: Primary | ICD-10-CM

## 2021-04-26 PROCEDURE — 91301 COVID-19 MODERNA VACCINE: CPT | Performed by: NURSE PRACTITIONER

## 2021-04-26 PROCEDURE — 0012A COVID-19 MODERNA VACCINE: CPT | Performed by: NURSE PRACTITIONER

## 2021-06-07 ENCOUNTER — TELEPHONE (OUTPATIENT)
Dept: INTERNAL MEDICINE CLINIC | Facility: CLINIC | Age: 56
End: 2021-06-07

## 2021-06-07 NOTE — TELEPHONE ENCOUNTER
Please call pt with results of Dexa taken at [address] [place] [address] on 6/3/21  Results should have been forwarded  Pt was started on medication, wanted Dr Ita Cueva take on results  Tasked to nursing

## 2021-06-08 ENCOUNTER — MED REC SCAN ONLY (OUTPATIENT)
Dept: INTERNAL MEDICINE CLINIC | Facility: CLINIC | Age: 56
End: 2021-06-08

## 2021-06-09 NOTE — TELEPHONE ENCOUNTER
1. Why did she have a repeat DEXA so soon after the initial DEXA (done at [address] [place] [address] in 11/2020)?     2. Repeat DEXA was significantly improved -- now in mild-mod osteopenia range (though it is difficult to compare scores when the DEXA was done on different oanh

## 2021-06-09 NOTE — TELEPHONE ENCOUNTER
Spoke with patient and relayed Dr. Gloria Henry' message. Patient verbalized an understanding to all information. Patient states that she had the repeat DEXA done because her endocrinologist directed her to do so.   Patient has more questions for Dr. Gloria Henry

## 2021-06-10 ENCOUNTER — VIRTUAL PHONE E/M (OUTPATIENT)
Dept: INTERNAL MEDICINE CLINIC | Facility: CLINIC | Age: 56
End: 2021-06-10
Payer: COMMERCIAL

## 2021-06-10 DIAGNOSIS — M85.88 OSTEOPENIA OF LUMBAR SPINE: Primary | ICD-10-CM

## 2021-06-10 PROCEDURE — 99213 OFFICE O/P EST LOW 20 MIN: CPT | Performed by: INTERNAL MEDICINE

## 2021-06-10 NOTE — PROGRESS NOTES
Virtual Telephone Check-In    Sarita Davis verbally consents to a Virtual/Telephone Check-In visit on 06/10/21. Patient has been referred to the [facility] website at [email or web address] to review the yearly Consent to Treat document.     Patient unders

## 2021-06-13 ENCOUNTER — TELEPHONE (OUTPATIENT)
Dept: INTERNAL MEDICINE CLINIC | Facility: CLINIC | Age: 56
End: 2021-06-13

## 2021-06-15 RX ORDER — MELOXICAM 7.5 MG/1
TABLET ORAL
Qty: 30 TABLET | Refills: 1 | Status: SHIPPED | OUTPATIENT
Start: 2021-06-15 | End: 2021-11-04

## 2021-07-22 RX ORDER — MELOXICAM 7.5 MG/1
TABLET ORAL
Qty: 30 TABLET | Refills: 1 | OUTPATIENT
Start: 2021-07-22

## 2021-07-22 NOTE — TELEPHONE ENCOUNTER
Requested Prescriptions     Refused Prescriptions Disp Refills   • MELOXICAM 7.5 MG Oral Tab [Pharmacy Med Name: MELOXICAM 7.5MG TABLETS] 30 tablet 1     Sig: TAKE 1 TABLET(7.5 MG) BY MOUTH DAILY     Refused By: Linda Arteaga     Reason for Refusa

## 2021-08-12 ENCOUNTER — TELEPHONE (OUTPATIENT)
Dept: INTERNAL MEDICINE CLINIC | Facility: CLINIC | Age: 56
End: 2021-08-12

## 2021-08-12 DIAGNOSIS — E06.3 HYPOTHYROIDISM DUE TO HASHIMOTO'S THYROIDITIS: Primary | ICD-10-CM

## 2021-08-12 DIAGNOSIS — E03.8 HYPOTHYROIDISM DUE TO HASHIMOTO'S THYROIDITIS: Primary | ICD-10-CM

## 2021-08-12 NOTE — TELEPHONE ENCOUNTER
Not happy with her endocrinologist at [facility]   Requests call back  with Dr Susu Ocampo' endocrinologist recommendation   [phone]

## 2021-09-14 ENCOUNTER — TELEPHONE (OUTPATIENT)
Dept: INTERNAL MEDICINE CLINIC | Facility: CLINIC | Age: 56
End: 2021-09-14

## 2021-09-14 DIAGNOSIS — Z12.31 ENCOUNTER FOR SCREENING MAMMOGRAM FOR MALIGNANT NEOPLASM OF BREAST: Primary | ICD-10-CM

## 2021-09-14 NOTE — TELEPHONE ENCOUNTER
Pt. Called to ask Dr. Benoit Mancera to order her annual screening mammo. She is also asking if we know where she had her last mammo done? She does not remember. Pt. Can be reached at [phone].

## 2021-09-29 ENCOUNTER — TELEPHONE (OUTPATIENT)
Dept: INTERNAL MEDICINE CLINIC | Facility: CLINIC | Age: 56
End: 2021-09-29

## 2021-09-29 NOTE — TELEPHONE ENCOUNTER
Ortho can order the MRI if he/she thinks it's necessary. I have never actually diagnosed/treated the pt for knee pain.   She saw ortho in the past for this

## 2021-09-29 NOTE — TELEPHONE ENCOUNTER
Pt requesting order for MRI of left knee  Pt discussed with Dr Naresh Page a year ago but was not ready to proceed at that time  Pt will be seeing a new orthopedic and would like to bring results with her  Please call pt with any questions  Tasked to nursing

## 2021-09-30 NOTE — TELEPHONE ENCOUNTER
Patient is calling to check the status of her refill request below. Patient was informed that the request was received yesterday and that refills can take 24-48 hours to get to the pharmacy. Patient states she is running low and needs the refills sooner.

## 2021-09-30 NOTE — TELEPHONE ENCOUNTER
What she needs to understand is that we can't just order an MRI without any justification, and expect her insurance company to pay for it. She has to have been examined and have it documented in order for her insurance company to consider covering it.   If

## 2021-09-30 NOTE — TELEPHONE ENCOUNTER
Called pt and left message on voicemail. Recommend that she start with ortho who can determine if she even needs an MRI.   Keep original PE appt with me on 11/4/21

## 2021-09-30 NOTE — TELEPHONE ENCOUNTER
Patient is calling to check the status os the order request below. Patient was informed she should call her ortho doctor to get the MRI results.  Patient verbalized frustration by this request. Patient is wondering if she makes an appointment with Dr Ang King

## 2021-10-01 RX ORDER — ALENDRONATE SODIUM 70 MG/1
TABLET ORAL
Qty: 12 TABLET | Refills: 0 | Status: SHIPPED | OUTPATIENT
Start: 2021-10-01 | End: 2021-12-08

## 2021-10-01 RX ORDER — ERGOCALCIFEROL 1.25 MG/1
50000 CAPSULE ORAL WEEKLY
Qty: 12 CAPSULE | Refills: 0 | Status: SHIPPED | OUTPATIENT
Start: 2021-10-01 | End: 2021-11-30

## 2021-10-01 NOTE — TELEPHONE ENCOUNTER
Spoke to patient and verbalized MD recommendations, pt was scheduled for 10/12 for a physical but per MD wants to keep her original physical/knee follow up 11/4 , patient is trying to have  order mri before patient see's ortho and MD called patient and

## 2021-11-04 ENCOUNTER — OFFICE VISIT (OUTPATIENT)
Dept: INTERNAL MEDICINE CLINIC | Facility: CLINIC | Age: 56
End: 2021-11-04
Payer: COMMERCIAL

## 2021-11-04 VITALS
DIASTOLIC BLOOD PRESSURE: 80 MMHG | HEART RATE: 69 BPM | TEMPERATURE: 98 F | HEIGHT: 63.5 IN | SYSTOLIC BLOOD PRESSURE: 106 MMHG | OXYGEN SATURATION: 96 % | BODY MASS INDEX: 24.78 KG/M2 | WEIGHT: 141.63 LBS

## 2021-11-04 DIAGNOSIS — E03.9 ACQUIRED HYPOTHYROIDISM: Primary | ICD-10-CM

## 2021-11-04 DIAGNOSIS — R73.03 PREDIABETES: ICD-10-CM

## 2021-11-04 DIAGNOSIS — Z00.00 ROUTINE HEALTH MAINTENANCE: ICD-10-CM

## 2021-11-04 DIAGNOSIS — M85.88 OSTEOPENIA OF LUMBAR SPINE: ICD-10-CM

## 2021-11-04 DIAGNOSIS — E78.00 HYPERCHOLESTEROLEMIA: ICD-10-CM

## 2021-11-04 DIAGNOSIS — R73.01 IMPAIRED FASTING GLUCOSE: ICD-10-CM

## 2021-11-04 PROCEDURE — 3008F BODY MASS INDEX DOCD: CPT | Performed by: INTERNAL MEDICINE

## 2021-11-04 PROCEDURE — 3074F SYST BP LT 130 MM HG: CPT | Performed by: INTERNAL MEDICINE

## 2021-11-04 PROCEDURE — 99396 PREV VISIT EST AGE 40-64: CPT | Performed by: INTERNAL MEDICINE

## 2021-11-04 PROCEDURE — 3079F DIAST BP 80-89 MM HG: CPT | Performed by: INTERNAL MEDICINE

## 2021-11-04 NOTE — PROGRESS NOTES
Veena Dempsey is a 61year old female. Patient presents with:  Physical: Annual Px, paps by Dr Sachin Haley.  Pt wants to discuss left knee again, pt did see ortho       HPI:   Veena Dempsey is a 61year old female new pt who presents for a complete physical Arthritis of left hand    • Arthritis of right hand    • CERVICAL DYSPLASIA     cryo   • Elevated cholesterol 3/21/2013   • Hx of diseases NEC      sAB3   • HYPOTHYROIDISM     Dr. Saranya Mejia   • Impaired fasting glucose 3/22/2017   • Iron deficiency a drinks      Comment: Rare    Drug use: No         REVIEW OF SYSTEMS:   GENERAL: feels well otherwise  SKIN: denies any unusual skin lesions  EYES:denies blurred vision or double vision  HEENT: denies nasal congestion, sinus pain or ST  LUNGS: denies shortn improved from -2.4 to -1.1, though discussed with pt that it was a different machine, so difficult to compare.  -rec continuing fosamax for 5 years (EOT 12/2026)  -cont weekly vitamin D 50,000 units; level normal (54) in 4/2021.  -cont calcium (1200mg/day

## 2021-11-05 ENCOUNTER — LAB ENCOUNTER (OUTPATIENT)
Dept: LAB | Age: 56
End: 2021-11-05
Attending: INTERNAL MEDICINE
Payer: COMMERCIAL

## 2021-11-05 ENCOUNTER — TELEPHONE (OUTPATIENT)
Dept: INTERNAL MEDICINE CLINIC | Facility: CLINIC | Age: 56
End: 2021-11-05

## 2021-11-05 DIAGNOSIS — Z00.00 ROUTINE HEALTH MAINTENANCE: ICD-10-CM

## 2021-11-05 DIAGNOSIS — E78.00 HYPERCHOLESTEROLEMIA: ICD-10-CM

## 2021-11-05 DIAGNOSIS — R73.03 PREDIABETES: ICD-10-CM

## 2021-11-05 PROCEDURE — 85025 COMPLETE CBC W/AUTO DIFF WBC: CPT

## 2021-11-05 PROCEDURE — 80053 COMPREHEN METABOLIC PANEL: CPT

## 2021-11-05 PROCEDURE — 83036 HEMOGLOBIN GLYCOSYLATED A1C: CPT | Performed by: INTERNAL MEDICINE

## 2021-11-05 PROCEDURE — 36415 COLL VENOUS BLD VENIPUNCTURE: CPT

## 2021-11-05 PROCEDURE — 82570 ASSAY OF URINE CREATININE: CPT

## 2021-11-05 PROCEDURE — 82043 UR ALBUMIN QUANTITATIVE: CPT

## 2021-11-05 PROCEDURE — 80061 LIPID PANEL: CPT

## 2021-11-05 NOTE — TELEPHONE ENCOUNTER
Patient is calling for a recommendation for a GI that is not affiliated with [facility]  She would like someone from [address] [place] [address] or [address] [place] [address]  This will be for her colonoscopy    Please call patient with info [phone]

## 2021-11-09 ENCOUNTER — TELEPHONE (OUTPATIENT)
Dept: INTERNAL MEDICINE CLINIC | Facility: CLINIC | Age: 56
End: 2021-11-09

## 2021-11-09 NOTE — TELEPHONE ENCOUNTER
Spoke to patient and provided her with contact info to Dr. Arlet Martinez. Patient asks about her mammo from [facility] and informed her we did receive today. She asks if they were able to compare from old records at [facility].  Informed her I do not see anything about compar

## 2021-11-18 ENCOUNTER — TELEPHONE (OUTPATIENT)
Dept: INTERNAL MEDICINE CLINIC | Facility: CLINIC | Age: 56
End: 2021-11-18

## 2021-11-18 NOTE — TELEPHONE ENCOUNTER
HgbA1c slightly elevated (5.7) -- still in mildly prediabetes range, but unchanged from last year. Continue exercise and limiting carbs. LDL slightly elevated. Still no indication for medication. TG and HDL were normal.     Other labs were fine.     R

## 2021-11-18 NOTE — TELEPHONE ENCOUNTER
Please advise      Called patient and advised that she await for MD to review labs. She is just asking what  input is on her lab results.      Fyi

## 2021-11-23 ENCOUNTER — EXTERNAL LAB (OUTPATIENT)
Dept: HEALTH INFORMATION MANAGEMENT | Facility: OTHER | Age: 56
End: 2021-11-23

## 2021-11-23 LAB — LAB RESULT: NORMAL

## 2021-11-24 ENCOUNTER — TELEPHONE (OUTPATIENT)
Dept: INTERNAL MEDICINE CLINIC | Facility: CLINIC | Age: 56
End: 2021-11-24

## 2021-11-27 RX ORDER — ERGOCALCIFEROL 1.25 MG/1
CAPSULE ORAL
Qty: 12 CAPSULE | Refills: 0 | Status: CANCELLED | OUTPATIENT
Start: 2021-11-27

## 2021-11-29 RX ORDER — ERGOCALCIFEROL 1.25 MG/1
CAPSULE ORAL
Qty: 12 CAPSULE | Refills: 0 | OUTPATIENT
Start: 2021-11-29

## 2021-11-29 NOTE — TELEPHONE ENCOUNTER
Current refill request refused due to refill is either a duplicate request or has active refills at the pharmacy. Check previous templates.     Requested Prescriptions     Refused Prescriptions Disp Refills   • ERGOCALCIFEROL 1.25 MG (87258 UT) Oral Cap [P

## 2021-11-29 NOTE — TELEPHONE ENCOUNTER
Pt called, pharmacy does not have available refills  please send Rx to [facility] [address]

## 2021-11-30 RX ORDER — ERGOCALCIFEROL 1.25 MG/1
50000 CAPSULE ORAL WEEKLY
Qty: 13 CAPSULE | Refills: 3 | Status: SHIPPED | OUTPATIENT
Start: 2021-11-30

## 2021-12-08 RX ORDER — ALENDRONATE SODIUM 70 MG/1
TABLET ORAL
Qty: 13 TABLET | Refills: 3 | Status: SHIPPED | OUTPATIENT
Start: 2021-12-08

## 2022-02-25 RX ORDER — ERGOCALCIFEROL 1.25 MG/1
CAPSULE ORAL
Qty: 12 CAPSULE | Refills: 0 | OUTPATIENT
Start: 2022-02-25

## 2022-06-27 ENCOUNTER — TELEPHONE (OUTPATIENT)
Dept: INTERNAL MEDICINE CLINIC | Facility: CLINIC | Age: 57
End: 2022-06-27

## 2022-06-27 NOTE — TELEPHONE ENCOUNTER
Pt is asking for nurse to call and relay results of her November 5, 2021 blood test that was ordered by Dr Yousuf Case     [phone]    Pt has changed her diet and has been exercising - asks if she should have another blood test now or wait until her yearly   w/Dr Yousuf Case

## 2022-06-27 NOTE — TELEPHONE ENCOUNTER
Please call patient regarding labs results ordered by her endocrinologist  Concern about blood glucose number  Tasked to nursing

## 2022-07-19 ENCOUNTER — MED REC SCAN ONLY (OUTPATIENT)
Dept: INTERNAL MEDICINE CLINIC | Facility: CLINIC | Age: 57
End: 2022-07-19

## 2022-08-09 ENCOUNTER — MED REC SCAN ONLY (OUTPATIENT)
Dept: INTERNAL MEDICINE CLINIC | Facility: CLINIC | Age: 57
End: 2022-08-09

## 2022-08-11 ENCOUNTER — TELEPHONE (OUTPATIENT)
Dept: INTERNAL MEDICINE CLINIC | Facility: CLINIC | Age: 57
End: 2022-08-11

## 2022-08-11 DIAGNOSIS — Z00.00 ANNUAL PHYSICAL EXAM: Primary | ICD-10-CM

## 2022-08-11 DIAGNOSIS — R73.03 PREDIABETES: ICD-10-CM

## 2022-08-11 DIAGNOSIS — Z12.31 ENCOUNTER FOR SCREENING MAMMOGRAM FOR MALIGNANT NEOPLASM OF BREAST: ICD-10-CM

## 2022-08-11 NOTE — TELEPHONE ENCOUNTER
Pt scheduled physical with Dr Anner Duane on   Nov 3, requests call back to advise if she is due for labs & mammogram prior    [phone]    Pt has TSH checked by specialist/results are forwarded to Dr Anner Duane

## 2022-08-11 NOTE — TELEPHONE ENCOUNTER
Lab order pended per protocol. TSH and vitamin D completed 6/20/22-- scanned. TSH 0.057 (range 0.270-4.200) and Vitamin D 50 (range 30-80). Routed to Dr. Rhys Flores please advise on pended order. Thanks!

## 2022-08-12 NOTE — TELEPHONE ENCOUNTER
Labs ordered. Mammo ordered (she gets done at outside facilty; due in Nov). I can give her the mammo order at her visit.

## 2022-08-12 NOTE — TELEPHONE ENCOUNTER
Called patient and relayed DR. COLLINS message - verbalized understanding  Lab orders faxed to [phone] per patients request-confirmation recieved

## 2022-09-08 ENCOUNTER — TELEPHONE (OUTPATIENT)
Dept: INTERNAL MEDICINE CLINIC | Facility: CLINIC | Age: 57
End: 2022-09-08

## 2022-09-08 NOTE — TELEPHONE ENCOUNTER
Pt is calling and would like to get a recommendation from Dr. Mattie St for a Endocrinologist.    Please call and advise

## 2022-09-09 NOTE — TELEPHONE ENCOUNTER
Spoke to patient and relayed MD message. Provided Dr Meyers [facility] office number and address. Pt verbalized understanding and agrees with plan.

## 2022-11-10 ENCOUNTER — OFFICE VISIT (OUTPATIENT)
Dept: INTERNAL MEDICINE CLINIC | Facility: CLINIC | Age: 57
End: 2022-11-10
Payer: COMMERCIAL

## 2022-11-10 VITALS
WEIGHT: 135 LBS | TEMPERATURE: 99 F | BODY MASS INDEX: 24.22 KG/M2 | OXYGEN SATURATION: 100 % | DIASTOLIC BLOOD PRESSURE: 64 MMHG | HEIGHT: 62.5 IN | SYSTOLIC BLOOD PRESSURE: 96 MMHG | HEART RATE: 83 BPM

## 2022-11-10 DIAGNOSIS — E03.9 ACQUIRED HYPOTHYROIDISM: ICD-10-CM

## 2022-11-10 DIAGNOSIS — Z00.00 ROUTINE HEALTH MAINTENANCE: ICD-10-CM

## 2022-11-10 DIAGNOSIS — M85.88 OSTEOPENIA OF LUMBAR SPINE: ICD-10-CM

## 2022-11-10 DIAGNOSIS — R73.01 IMPAIRED FASTING GLUCOSE: ICD-10-CM

## 2022-11-10 DIAGNOSIS — E78.00 HYPERCHOLESTEROLEMIA: ICD-10-CM

## 2022-11-10 DIAGNOSIS — R73.03 PREDIABETES: ICD-10-CM

## 2022-11-10 DIAGNOSIS — Z78.0 POSTMENOPAUSE: ICD-10-CM

## 2022-11-10 DIAGNOSIS — M85.89 OSTEOPENIA OF MULTIPLE SITES: Primary | ICD-10-CM

## 2022-11-10 PROCEDURE — 99396 PREV VISIT EST AGE 40-64: CPT | Performed by: INTERNAL MEDICINE

## 2022-11-10 PROCEDURE — 3078F DIAST BP <80 MM HG: CPT | Performed by: INTERNAL MEDICINE

## 2022-11-10 PROCEDURE — 3008F BODY MASS INDEX DOCD: CPT | Performed by: INTERNAL MEDICINE

## 2022-11-10 PROCEDURE — 3074F SYST BP LT 130 MM HG: CPT | Performed by: INTERNAL MEDICINE

## 2022-11-10 RX ORDER — ALENDRONATE SODIUM 70 MG/1
70 TABLET ORAL WEEKLY
Qty: 13 TABLET | Refills: 3 | Status: SHIPPED | OUTPATIENT
Start: 2022-11-10

## 2022-11-10 RX ORDER — LEVOTHYROXINE SODIUM 88 UG/1
CAPSULE ORAL DAILY
COMMUNITY

## 2022-11-11 ENCOUNTER — TELEPHONE (OUTPATIENT)
Dept: INTERNAL MEDICINE CLINIC | Facility: CLINIC | Age: 57
End: 2022-11-11

## 2022-11-11 NOTE — TELEPHONE ENCOUNTER
Patient had an appointment yesterday with   Dr Tania Gonzalez and cannot remember if a breast exam was done  Requests call back to advise [phone]

## 2022-11-14 ENCOUNTER — TELEPHONE (OUTPATIENT)
Dept: INTERNAL MEDICINE CLINIC | Facility: CLINIC | Age: 57
End: 2022-11-14

## 2022-11-14 NOTE — TELEPHONE ENCOUNTER
Patient is calling and states that she asked that her most recent labs from [facility] be faxed over to Dr. Shala Cummings. Patient is calling to see if fax was received. Fax was received and placed in Dr. Ashutosh Bernal. Patient is asking for Dr. Shala Cummings to call her after the reviews all the labs. Please call and advise.

## 2022-11-16 NOTE — TELEPHONE ENCOUNTER
Labs reviewed. All normal (other than low TSH which her endocrinologist has addressed).   Sent to scanning

## 2022-12-06 ENCOUNTER — MED REC SCAN ONLY (OUTPATIENT)
Dept: INTERNAL MEDICINE CLINIC | Facility: CLINIC | Age: 57
End: 2022-12-06

## 2023-01-27 RX ORDER — ERGOCALCIFEROL 1.25 MG/1
CAPSULE ORAL
Qty: 13 CAPSULE | Refills: 3 | Status: SHIPPED | OUTPATIENT
Start: 2023-01-27

## 2023-08-14 ENCOUNTER — TELEPHONE (OUTPATIENT)
Dept: INTERNAL MEDICINE CLINIC | Facility: CLINIC | Age: 58
End: 2023-08-14

## 2023-08-14 DIAGNOSIS — Z12.31 SCREENING MAMMOGRAM FOR BREAST CANCER: Primary | ICD-10-CM

## 2023-08-14 NOTE — TELEPHONE ENCOUNTER
Patient scheduled physical   Does patient need to schedule mammogram?  Please call patient to advise and if needed when order in place  Patient will need order mailed to her  Tasked to nursing

## 2023-08-14 NOTE — TELEPHONE ENCOUNTER
Left message to call back. Order entered per protocol - last mammogram in  October - patient wants order mailed?

## 2023-09-11 ENCOUNTER — TELEPHONE (OUTPATIENT)
Dept: INTERNAL MEDICINE CLINIC | Facility: CLINIC | Age: 58
End: 2023-09-11

## 2023-09-11 NOTE — TELEPHONE ENCOUNTER
Called patient who is due for DEXA - found written order and will go to [facility]

## 2023-09-15 ENCOUNTER — TELEPHONE (OUTPATIENT)
Dept: INTERNAL MEDICINE CLINIC | Facility: CLINIC | Age: 58
End: 2023-09-15

## 2023-09-15 NOTE — TELEPHONE ENCOUNTER
Patient requesting Dr Nury Hassan recommendation for an Endocrinologist  The physician that patient currently seeing has regularly rescheduled her and she is hoping to make a change  Tasked to nursing

## 2023-09-18 ENCOUNTER — TELEPHONE (OUTPATIENT)
Dept: INTERNAL MEDICINE CLINIC | Facility: CLINIC | Age: 58
End: 2023-09-18

## 2023-09-18 NOTE — TELEPHONE ENCOUNTER
Left detailed message with patient, relaying MD message below.  (OK per [facility] Verbal Release / Privacy)

## 2023-09-20 ENCOUNTER — VIRTUAL PHONE E/M (OUTPATIENT)
Dept: INTERNAL MEDICINE CLINIC | Facility: CLINIC | Age: 58
End: 2023-09-20

## 2023-09-20 DIAGNOSIS — R73.01 IMPAIRED FASTING GLUCOSE: ICD-10-CM

## 2023-09-20 DIAGNOSIS — E78.00 HYPERCHOLESTEROLEMIA: ICD-10-CM

## 2023-09-20 DIAGNOSIS — R73.03 PREDIABETES: ICD-10-CM

## 2023-09-20 DIAGNOSIS — Z71.84 TRAVEL ADVICE ENCOUNTER: Primary | ICD-10-CM

## 2023-09-20 DIAGNOSIS — Z00.00 ROUTINE HEALTH MAINTENANCE: ICD-10-CM

## 2023-09-20 DIAGNOSIS — R20.0 NUMBNESS OF LEFT FOOT: ICD-10-CM

## 2023-09-20 PROCEDURE — 99443 PHONE E/M BY PHYS 21-30 MIN: CPT | Performed by: INTERNAL MEDICINE

## 2023-09-20 NOTE — PROGRESS NOTES
Virtual Telephone Check-In    [address] verbally consents to a Virtual/Telephone Check-In visit on 09/20/23. Patient has been referred to the [facility] website at [email or web address] to review the yearly Consent to Treat document. Patient understands and accepts financial responsibility for any deductible, co-insurance and/or co-pays associated with this service. Duration of the service: 25 minutes      Summary of topics discussed:     Discussion of vaccines -- going to [place] in December    Notes tingling in her left foot. Asking if it could be her blood sugar  Last HgbA1c 5.5 in 10/2022. Sees endocrine for her thyroid      Travel advice  -pt traveling to [place] A/B series  -rec typhoid -- sent to pharmacy  -not traveling to [place] [facility] or [place])    Left foot tingling  -unclear etiology  -rec appt   -check B12    Hypothyroidism  -sees endocrine Dr. Nanette Arroyo    Has upcoming PE with me in 10/2023  She is getting her labs done at [address] [place] [address].     Omid Davis MD

## 2023-09-21 ENCOUNTER — OFFICE VISIT (OUTPATIENT)
Dept: INTERNAL MEDICINE CLINIC | Facility: CLINIC | Age: 58
End: 2023-09-21

## 2023-09-21 ENCOUNTER — LAB ENCOUNTER (OUTPATIENT)
Dept: LAB | Facility: REFERENCE LAB | Age: 58
End: 2023-09-21
Attending: INTERNAL MEDICINE
Payer: COMMERCIAL

## 2023-09-21 VITALS
DIASTOLIC BLOOD PRESSURE: 74 MMHG | WEIGHT: 146 LBS | SYSTOLIC BLOOD PRESSURE: 114 MMHG | HEART RATE: 76 BPM | HEIGHT: 62.5 IN | TEMPERATURE: 98 F | OXYGEN SATURATION: 98 % | BODY MASS INDEX: 26.19 KG/M2

## 2023-09-21 DIAGNOSIS — E78.00 HYPERCHOLESTEROLEMIA: ICD-10-CM

## 2023-09-21 DIAGNOSIS — E03.8 HYPOTHYROIDISM DUE TO HASHIMOTO'S THYROIDITIS: Primary | ICD-10-CM

## 2023-09-21 DIAGNOSIS — E06.3 HYPOTHYROIDISM DUE TO HASHIMOTO'S THYROIDITIS: Primary | ICD-10-CM

## 2023-09-21 DIAGNOSIS — R73.03 PREDIABETES: ICD-10-CM

## 2023-09-21 DIAGNOSIS — E66.3 OVERWEIGHT (BMI 25.0-29.9): Primary | ICD-10-CM

## 2023-09-21 DIAGNOSIS — R20.0 NUMBNESS OF LEFT FOOT: ICD-10-CM

## 2023-09-21 DIAGNOSIS — Z00.00 ROUTINE HEALTH MAINTENANCE: ICD-10-CM

## 2023-09-21 LAB
ALBUMIN SERPL-MCNC: 3.9 G/DL (ref 3.4–5)
ALBUMIN/GLOB SERPL: 1.1 {RATIO} (ref 1–2)
ALP LIVER SERPL-CCNC: 50 U/L
ALT SERPL-CCNC: 22 U/L
ANION GAP SERPL CALC-SCNC: 9 MMOL/L (ref 0–18)
AST SERPL-CCNC: 19 U/L (ref 15–37)
BASOPHILS # BLD AUTO: 0.02 X10(3) UL (ref 0–0.2)
BASOPHILS NFR BLD AUTO: 0.4 %
BILIRUB SERPL-MCNC: 0.6 MG/DL (ref 0.1–2)
BUN BLD-MCNC: 12 MG/DL (ref 7–18)
BUN/CREAT SERPL: 12.9 (ref 10–20)
CALCIUM BLD-MCNC: 9.5 MG/DL (ref 8.5–10.1)
CHLORIDE SERPL-SCNC: 107 MMOL/L (ref 98–112)
CHOLEST SERPL-MCNC: 216 MG/DL (ref ?–200)
CO2 SERPL-SCNC: 26 MMOL/L (ref 21–32)
CREAT BLD-MCNC: 0.93 MG/DL
DEPRECATED RDW RBC AUTO: 41.7 FL (ref 35.1–46.3)
EGFRCR SERPLBLD CKD-EPI 2021: 71 ML/MIN/1.73M2 (ref 60–?)
EOSINOPHIL # BLD AUTO: 0.1 X10(3) UL (ref 0–0.7)
EOSINOPHIL NFR BLD AUTO: 1.8 %
ERYTHROCYTE [DISTWIDTH] IN BLOOD BY AUTOMATED COUNT: 12.5 % (ref 11–15)
EST. AVERAGE GLUCOSE BLD GHB EST-MCNC: 114 MG/DL (ref 68–126)
FASTING PATIENT LIPID ANSWER: YES
FASTING STATUS PATIENT QL REPORTED: YES
GLOBULIN PLAS-MCNC: 3.5 G/DL (ref 2.8–4.4)
GLUCOSE BLD-MCNC: 96 MG/DL (ref 70–99)
HBA1C MFR BLD: 5.6 % (ref ?–5.7)
HCT VFR BLD AUTO: 46.2 %
HDLC SERPL-MCNC: 62 MG/DL (ref 40–59)
HGB BLD-MCNC: 15.1 G/DL
IMM GRANULOCYTES # BLD AUTO: 0.01 X10(3) UL (ref 0–1)
IMM GRANULOCYTES NFR BLD: 0.2 %
LDLC SERPL CALC-MCNC: 138 MG/DL (ref ?–100)
LYMPHOCYTES # BLD AUTO: 1.48 X10(3) UL (ref 1–4)
LYMPHOCYTES NFR BLD AUTO: 26.1 %
MCH RBC QN AUTO: 29.5 PG (ref 26–34)
MCHC RBC AUTO-ENTMCNC: 32.7 G/DL (ref 31–37)
MCV RBC AUTO: 90.2 FL
MONOCYTES # BLD AUTO: 0.3 X10(3) UL (ref 0.1–1)
MONOCYTES NFR BLD AUTO: 5.3 %
NEUTROPHILS # BLD AUTO: 3.75 X10 (3) UL (ref 1.5–7.7)
NEUTROPHILS # BLD AUTO: 3.75 X10(3) UL (ref 1.5–7.7)
NEUTROPHILS NFR BLD AUTO: 66.2 %
NONHDLC SERPL-MCNC: 154 MG/DL (ref ?–130)
OSMOLALITY SERPL CALC.SUM OF ELEC: 294 MOSM/KG (ref 275–295)
PLATELET # BLD AUTO: 220 10(3)UL (ref 150–450)
POTASSIUM SERPL-SCNC: 3.8 MMOL/L (ref 3.5–5.1)
PROT SERPL-MCNC: 7.4 G/DL (ref 6.4–8.2)
RBC # BLD AUTO: 5.12 X10(6)UL
SODIUM SERPL-SCNC: 142 MMOL/L (ref 136–145)
TRIGL SERPL-MCNC: 91 MG/DL (ref 30–149)
TSI SER-ACNC: 1.63 MIU/ML (ref 0.36–3.74)
VIT B12 SERPL-MCNC: 350 PG/ML (ref 193–986)
VIT D+METAB SERPL-MCNC: 48.5 NG/ML (ref 30–100)
VLDLC SERPL CALC-MCNC: 17 MG/DL (ref 0–30)
WBC # BLD AUTO: 5.7 X10(3) UL (ref 4–11)

## 2023-09-21 PROCEDURE — 82607 VITAMIN B-12: CPT

## 2023-09-21 PROCEDURE — 83036 HEMOGLOBIN GLYCOSYLATED A1C: CPT | Performed by: INTERNAL MEDICINE

## 2023-09-21 PROCEDURE — 3008F BODY MASS INDEX DOCD: CPT | Performed by: INTERNAL MEDICINE

## 2023-09-21 PROCEDURE — 90632 HEPA VACCINE ADULT IM: CPT | Performed by: INTERNAL MEDICINE

## 2023-09-21 PROCEDURE — 3074F SYST BP LT 130 MM HG: CPT | Performed by: INTERNAL MEDICINE

## 2023-09-21 PROCEDURE — 85025 COMPLETE CBC W/AUTO DIFF WBC: CPT

## 2023-09-21 PROCEDURE — 90471 IMMUNIZATION ADMIN: CPT | Performed by: INTERNAL MEDICINE

## 2023-09-21 PROCEDURE — 84443 ASSAY THYROID STIM HORMONE: CPT

## 2023-09-21 PROCEDURE — 90472 IMMUNIZATION ADMIN EACH ADD: CPT | Performed by: INTERNAL MEDICINE

## 2023-09-21 PROCEDURE — 90746 HEPB VACCINE 3 DOSE ADULT IM: CPT | Performed by: INTERNAL MEDICINE

## 2023-09-21 PROCEDURE — 80061 LIPID PANEL: CPT

## 2023-09-21 PROCEDURE — 82306 VITAMIN D 25 HYDROXY: CPT

## 2023-09-21 PROCEDURE — 36415 COLL VENOUS BLD VENIPUNCTURE: CPT

## 2023-09-21 PROCEDURE — 80053 COMPREHEN METABOLIC PANEL: CPT

## 2023-09-21 PROCEDURE — 3078F DIAST BP <80 MM HG: CPT | Performed by: INTERNAL MEDICINE

## 2023-09-21 RX ORDER — LEVOTHYROXINE SODIUM 75 UG/1
1 CAPSULE ORAL
COMMUNITY

## 2023-10-12 ENCOUNTER — OFFICE VISIT (OUTPATIENT)
Dept: INTERNAL MEDICINE CLINIC | Facility: CLINIC | Age: 58
End: 2023-10-12

## 2023-10-12 VITALS
HEIGHT: 62.5 IN | SYSTOLIC BLOOD PRESSURE: 100 MMHG | DIASTOLIC BLOOD PRESSURE: 70 MMHG | HEART RATE: 88 BPM | WEIGHT: 148 LBS | BODY MASS INDEX: 26.55 KG/M2 | TEMPERATURE: 97 F | OXYGEN SATURATION: 100 %

## 2023-10-12 DIAGNOSIS — Z00.00 ROUTINE HEALTH MAINTENANCE: ICD-10-CM

## 2023-10-12 DIAGNOSIS — M85.88 OSTEOPENIA OF LUMBAR SPINE: ICD-10-CM

## 2023-10-12 DIAGNOSIS — R73.03 PREDIABETES: ICD-10-CM

## 2023-10-12 DIAGNOSIS — E03.9 ACQUIRED HYPOTHYROIDISM: ICD-10-CM

## 2023-10-12 DIAGNOSIS — N95.2 POST-MENOPAUSAL ATROPHIC VAGINITIS: ICD-10-CM

## 2023-10-12 DIAGNOSIS — E53.8 VITAMIN B12 DEFICIENCY: ICD-10-CM

## 2023-10-12 DIAGNOSIS — R73.01 IMPAIRED FASTING GLUCOSE: Primary | ICD-10-CM

## 2023-10-12 DIAGNOSIS — E78.00 HYPERCHOLESTEROLEMIA: ICD-10-CM

## 2023-10-12 PROBLEM — M18.0 ARTHRITIS OF CARPOMETACARPAL (CMC) JOINTS OF BOTH THUMBS: Status: RESOLVED | Noted: 2017-09-15 | Resolved: 2023-10-12

## 2023-10-12 PROBLEM — F41.9 ANXIETY: Status: RESOLVED | Noted: 2017-03-20 | Resolved: 2023-10-12

## 2023-10-12 PROCEDURE — 3074F SYST BP LT 130 MM HG: CPT | Performed by: INTERNAL MEDICINE

## 2023-10-12 PROCEDURE — 99396 PREV VISIT EST AGE 40-64: CPT | Performed by: INTERNAL MEDICINE

## 2023-10-12 PROCEDURE — 3078F DIAST BP <80 MM HG: CPT | Performed by: INTERNAL MEDICINE

## 2023-10-12 PROCEDURE — 3008F BODY MASS INDEX DOCD: CPT | Performed by: INTERNAL MEDICINE

## 2023-10-23 ENCOUNTER — TELEPHONE (OUTPATIENT)
Dept: INTERNAL MEDICINE CLINIC | Facility: CLINIC | Age: 58
End: 2023-10-23

## 2023-10-23 ENCOUNTER — NURSE ONLY (OUTPATIENT)
Dept: INTERNAL MEDICINE CLINIC | Facility: CLINIC | Age: 58
End: 2023-10-23

## 2023-10-23 DIAGNOSIS — Z23 IMMUNIZATION DUE: Primary | ICD-10-CM

## 2023-10-23 PROCEDURE — 90746 HEPB VACCINE 3 DOSE ADULT IM: CPT | Performed by: INTERNAL MEDICINE

## 2023-10-23 PROCEDURE — 90471 IMMUNIZATION ADMIN: CPT | Performed by: INTERNAL MEDICINE

## 2023-10-23 NOTE — TELEPHONE ENCOUNTER
Patient is calling, scheduled for a second Hep B tomorrow but is wondering if she can come in today or Wednesday instead. Is that okay?  # [phone]     FYI - patient also requested a new patient appointment for her son with Dr Nathalie Dumas, patient was informed of his next opening on 12/28/2023.

## 2023-10-23 NOTE — PROGRESS NOTES
Patient is here today for 2nd Hep B. Patient has verified purpose of visit, their name and . Injection was administered by [facility]. Patient tolerated IM injection (left deltoid muscle) well. Pt is aware they are to return in  5 months for next/final Hep B (and A) injection/s. See [facility] Additional Details ie.  NDC, LOT & EXP of single dose vial.

## 2024-02-23 RX ORDER — ERGOCALCIFEROL 1.25 MG/1
CAPSULE ORAL
Qty: 13 CAPSULE | Refills: 3 | Status: SHIPPED | OUTPATIENT
Start: 2024-02-23

## 2024-02-23 NOTE — TELEPHONE ENCOUNTER
Refill request is for a maintenance medication and has met the criteria specified in the Ambulatory Medication Refill Standing Order for eligibility, visits, laboratory, alerts and was sent to the requested pharmacy.    Requested Prescriptions     Signed Prescriptions Disp Refills    ERGOCALCIFEROL 1.25 MG (94303 UT) Oral Cap 13 capsule 3     Sig: TAKE 1 CAPSULE BY MOUTH 1 TIME A WEEK     Authorizing Provider: PAM KEE     Ordering User: CHRIS MARKS

## 2024-03-14 ENCOUNTER — TELEPHONE (OUTPATIENT)
Dept: INTERNAL MEDICINE CLINIC | Facility: CLINIC | Age: 59
End: 2024-03-14

## 2024-03-14 NOTE — TELEPHONE ENCOUNTER
Patient calling asking if she can get her next set of vaccines, Hep A and Hep B tomorrow 3/15/2024.    Has appointment for vaccine next Friday, asking if tomorrow would be too soon.    She will be here tomorrow with her sons who have appointments with Dr. Harris.     Best call back number [phone]

## 2024-03-22 ENCOUNTER — NURSE ONLY (OUTPATIENT)
Dept: INTERNAL MEDICINE CLINIC | Facility: CLINIC | Age: 59
End: 2024-03-22

## 2024-03-22 DIAGNOSIS — Z23 IMMUNIZATION DUE: Primary | ICD-10-CM

## 2024-03-22 PROCEDURE — 90746 HEPB VACCINE 3 DOSE ADULT IM: CPT | Performed by: INTERNAL MEDICINE

## 2024-03-22 PROCEDURE — 90471 IMMUNIZATION ADMIN: CPT | Performed by: INTERNAL MEDICINE

## 2024-03-22 PROCEDURE — 90472 IMMUNIZATION ADMIN EACH ADD: CPT | Performed by: INTERNAL MEDICINE

## 2024-03-22 PROCEDURE — 90632 HEPA VACCINE ADULT IM: CPT | Performed by: INTERNAL MEDICINE

## 2024-04-01 NOTE — PROGRESS NOTES
Pt seen 3-22-24 for final Hep A ( lt deltoid) and Hep B ( Rt deltoid) immunization. Tolerated injections well and w/o issues

## 2024-04-22 ENCOUNTER — TELEPHONE (OUTPATIENT)
Dept: INTERNAL MEDICINE CLINIC | Facility: CLINIC | Age: 59
End: 2024-04-22

## 2024-04-22 NOTE — TELEPHONE ENCOUNTER
Sami mensah who saw a dark flat spot under left breast in the shower .it is less then dime size , not itchy , but she does not know how long it has been there -Transferred to SATINDER Willingham to schedule beatrice

## 2024-04-22 NOTE — TELEPHONE ENCOUNTER
Patient was taking a shower and noticed a brown spot on the lower part of her left breast   Hasn't noticed before, not sure how long it's been there   Requests call back from nurse to discuss/advise    [phone]

## 2024-04-23 ENCOUNTER — OFFICE VISIT (OUTPATIENT)
Dept: INTERNAL MEDICINE CLINIC | Facility: CLINIC | Age: 59
End: 2024-04-23
Payer: COMMERCIAL

## 2024-04-23 VITALS
WEIGHT: 147 LBS | HEART RATE: 71 BPM | SYSTOLIC BLOOD PRESSURE: 114 MMHG | DIASTOLIC BLOOD PRESSURE: 82 MMHG | BODY MASS INDEX: 26.38 KG/M2 | TEMPERATURE: 98 F | OXYGEN SATURATION: 98 % | HEIGHT: 62.5 IN

## 2024-04-23 DIAGNOSIS — D22.9 NEVUS: Primary | ICD-10-CM

## 2024-04-23 PROCEDURE — 3079F DIAST BP 80-89 MM HG: CPT | Performed by: INTERNAL MEDICINE

## 2024-04-23 PROCEDURE — 3008F BODY MASS INDEX DOCD: CPT | Performed by: INTERNAL MEDICINE

## 2024-04-23 PROCEDURE — 99213 OFFICE O/P EST LOW 20 MIN: CPT | Performed by: INTERNAL MEDICINE

## 2024-04-23 PROCEDURE — 3074F SYST BP LT 130 MM HG: CPT | Performed by: INTERNAL MEDICINE

## 2024-04-23 NOTE — PROGRESS NOTES
Joy Bolanos is a 59 year old female.  Chief Complaint   Patient presents with    Spot     Dark flat spot under left breast     HPI:     Pt recently noticed a brown spot on her left lower breast when showering. No pain.  Worried it might be something.    Current Outpatient Medications   Medication Sig Dispense Refill    ERGOCALCIFEROL 1.25 MG (53437 UT) Oral Cap TAKE 1 CAPSULE BY MOUTH 1 TIME A WEEK 13 capsule 3    Levothyroxine Sodium (TIROSINT) 75 MCG Oral Cap Take 1 tablet by mouth. 6 days a week      melatonin 3 MG Oral Tab Take by mouth nightly. 1-2 tablets      GLUCOSAMINE-CHONDROITIN OR Take 1 tablet by mouth daily.        alendronate 70 MG Oral Tab Take 1 tablet (70 mg total) by mouth once a week. (Patient not taking: Reported on 10/12/2023) 13 tablet 3      Past Medical History:    Allergic rhinitis    Allergic rhinitis, cause unspecified    Anxiety    Arthritis of left hand    Arthritis of right hand    CERVICAL DYSPLASIA    cryo    Elevated cholesterol    Hx of diseases NEC     sAB3    HYPOTHYROIDISM    Dr. Jaquez    Impaired fasting glucose    Iron deficiency anemia    GI workup with Dr. Arteaga    Labral tear of hip joint    Right    OTHER DISEASES    h/o infertility - IVF    Primary insomnia    Primary osteoarthritis of first carpometacarpal joint of left hand    Primary osteoarthritis of first carpometacarpal joint of right hand    SEASONAL ALLERGIES    ODIN (stress urinary incontinence, female)      Social History:  Social History     Socioeconomic History    Marital status:     Number of children: 3   Occupational History    Occupation: Homemaker   Tobacco Use    Smoking status: Never    Smokeless tobacco: Never   Vaping Use    Vaping status: Never Used   Substance and Sexual Activity    Alcohol use: Yes     Alcohol/week: 0.0 standard drinks of alcohol     Comment: Rare    Drug use: No    Sexual activity: Yes     Partners: Male     Birth control/protection: Pill     Comment: Current  10/26/2015        REVIEW OF SYSTEMS:   GENERAL HEALTH: feels well otherwise  RESPIRATORY: no SOB  CARDIOVASCULAR: no chest pain/pressure  GI: no nausea, vomiting, diarrhea    Wt Readings from Last 5 Encounters:   04/23/24 147 lb (66.7 kg)   10/12/23 148 lb (67.1 kg)   09/21/23 146 lb (66.2 kg)   11/10/22 135 lb (61.2 kg)   11/08/21 139 lb (63 kg)     Body mass index is 26.46 kg/m².      EXAM:   /82 (BP Location: Right arm, Patient Position: Sitting)   Pulse 71   Temp 97.6 °F (36.4 °C) (Oral)   Ht 5' 2.5\" (1.588 m)   Wt 147 lb (66.7 kg)   LMP 10/11/2010   SpO2 98%   BMI 26.46 kg/m²   GENERAL: well developed, well nourished, in no apparent distress  SKIN: small approx 2x3 mm light brown, slightly raised macule on left breast (LOQ).  Similar, slightly larger macule on right forearm    ASSESSMENT AND PLAN:     Nevus, left breast  -benign appearance; pt reassured  -given her concern about new moles, referred to derm Dr. Quigley for ROBBIE    The patient indicates understanding of these issues and agrees to the plan.    Yane Rivera MD, 04/23/24, 11:37 AM

## 2024-07-01 ENCOUNTER — OFFICE VISIT (OUTPATIENT)
Dept: ENDOCRINOLOGY CLINIC | Facility: CLINIC | Age: 59
End: 2024-07-01

## 2024-07-01 VITALS
WEIGHT: 148 LBS | HEART RATE: 67 BPM | DIASTOLIC BLOOD PRESSURE: 70 MMHG | HEIGHT: 63.5 IN | SYSTOLIC BLOOD PRESSURE: 104 MMHG | BODY MASS INDEX: 25.9 KG/M2

## 2024-07-01 DIAGNOSIS — E03.8 HYPOTHYROIDISM DUE TO HASHIMOTO'S THYROIDITIS: Primary | ICD-10-CM

## 2024-07-01 DIAGNOSIS — E06.3 HYPOTHYROIDISM DUE TO HASHIMOTO'S THYROIDITIS: Primary | ICD-10-CM

## 2024-07-01 RX ORDER — LEVOTHYROXINE SODIUM 0.07 MG/1
75 TABLET ORAL
COMMUNITY
Start: 2024-04-23

## 2024-07-01 NOTE — PROGRESS NOTES
Name: Joy Bolanos  Date: 7/1/2024    Referring Physician: No ref. provider found    Chief Complaint   Patient presents with    Consult     Referred for thyroid, has seen previous endocrinologist 1 year ago, last labs done in September       HISTORY OF PRESENT ILLNESS   Joy Bolanos is a 59 year old female who presents for   Chief Complaint   Patient presents with    Consult     Referred for thyroid, has seen previous endocrinologist 1 year ago, last labs done in September     58 y/o F presents for initial evaluation of hypothyroidism diagnosed approximately 30 years ago during pregnancy.  Since that time she has been maintained on long term thyroid hormone.     She is maintained on Levothyroxine 75mcg PO daily, taking in AM and waiting 30-60 min before eating (only taking 6 days per week).      She is concerned about 10lbs of weight gain in the past couple of years.  Energy level is stable.      Diet: trying to decrease CHO amount, low carb bread, although more cheating since kids get home from school     DEXA normal 2023    Thyroid US negative for nodules 2021     REVIEW OF SYSTEMS  Eyes: no change in vision  Neurologic: no headache, generalized or focal weakness or numbness.  Head: normal  ENT: normal  Lungs: no shortness of breath, wheezing or CASTELLON  Cardiovascular:  no chest pain or palpitations  Gastrointestinal:  no abdominal pain, bowel movement problems  Musculoskeletal: no muscle pain or arthralgia  /Gyne: no frequency or discomfort while urinating  Psychiatric:  no acute distress, anxiety  or depression  Skin: normal moisturized skin    Medications:     Current Outpatient Medications:     levothyroxine 75 MCG Oral Tab, Take 1 tablet (75 mcg total) by mouth. Take 1 tablet 6 days per week, Disp: , Rfl:     ERGOCALCIFEROL 1.25 MG (17747 UT) Oral Cap, TAKE 1 CAPSULE BY MOUTH 1 TIME A WEEK, Disp: 13 capsule, Rfl: 3    GLUCOSAMINE-CHONDROITIN OR, Take 1 tablet by mouth daily.  , Disp: , Rfl:      alendronate 70 MG Oral Tab, Take 1 tablet (70 mg total) by mouth once a week. (Patient not taking: Reported on 10/12/2023), Disp: 13 tablet, Rfl: 3    melatonin 3 MG Oral Tab, Take by mouth nightly. 1-2 tablets (Patient not taking: Reported on 2024), Disp: , Rfl:      Allergies:   No Known Allergies    Social History:   Social History     Socioeconomic History    Marital status:     Number of children: 3   Occupational History    Occupation: Homemaker   Tobacco Use    Smoking status: Never    Smokeless tobacco: Never   Vaping Use    Vaping status: Never Used   Substance and Sexual Activity    Alcohol use: Yes     Alcohol/week: 0.0 standard drinks of alcohol     Comment: Rare    Drug use: No    Sexual activity: Yes     Partners: Male     Birth control/protection: Pill     Comment: Current 10/26/2015       Medical History:   Past Medical History:    Allergic rhinitis    Allergic rhinitis, cause unspecified    Anxiety    Arthritis of left hand    Arthritis of right hand    CERVICAL DYSPLASIA    cryo    Elevated cholesterol    Hx of diseases NEC     sAB3    HYPOTHYROIDISM    Dr. Jaquez    Impaired fasting glucose    Iron deficiency anemia    GI workup with Dr. Bundy    Labral tear of hip joint    Right    OTHER DISEASES    h/o infertility - IVF    Primary insomnia    Primary osteoarthritis of first carpometacarpal joint of left hand    Primary osteoarthritis of first carpometacarpal joint of right hand    SEASONAL ALLERGIES    ODIN (stress urinary incontinence, female)       Surgical history:   Past Surgical History:   Procedure Laterality Date           delivery only      x 1    Colonoscopy      Negative with Dr. Bundy.  Repeat     Colonoscopy,diagnostic  2011    Performed by MARÍA ELENA BUNDY at [facility] [place]    D & c      missed ab    Endometrial ablation  9/30/10    Thermachoice III    Hysterectomy  12-16    Ovarian cyst and uterine leiomyoma- Benign  with Dr. Davenport    Hysteroscopy  9/30/10    Hysteroscopy, D&C, Thermachoice III Endometrial Ablation    Leep  1997    Other surgical history  1998    lapartomy for fibroids, she states\"lots of adhesions like PID\", one tube ligated & other one unable, Dr was Dr Kieran Patel at [facility]    Special service or report      IVF for infertility    Special service or report      Laparoscopy for infertility    Tubal ligation  1998    Upper gi endoscopy - referral  12-11    Negative with Dr. Arteaga    Upper gi endoscopy,biopsy  12/20/2011    Performed by MARÍA ELENA ARTEAGA at [facility] [place]       PHYSICAL EXAMINATION:  /70   Pulse 67   Ht 5' 3.5\" (1.613 m)   Wt 148 lb (67.1 kg)   LMP 10/11/2010   BMI 25.81 kg/m²     General Appearance:  Alert, in no acute distress, well developed  Eyes: normal conjunctivae, sclera.  Ears/Nose/Mouth/Throat/Neck:  normal hearing, normal speech   Musculoskeletal:  normal muscle strength and tone  PV: normal pulses of carotids, pedals  Skin:  normal moisture and skin texture  Hair & Nails:  normal scalp hair     Neuro:  sensory grossly intact and motor grossly intact  Psychiatric:  oriented to time, self, and place  Nutritional:  no abnormal weight gain or loss    ASSESSMENT/PLAN:    Hypothyroidism  - Discussed importance of long term treatment  - Continue current dose of Levothyroxine  - Discussed taking in AM and waiting 30-60 min before eating  - Recheck TSH, FT4  -Thyroid US negative for nodules, small gland 2021   - Discussed decreasing CHO amount and calories for weight loss    RTC 1 year         7/1/2024  Eulalia Garcia MD

## 2024-07-02 ENCOUNTER — TELEPHONE (OUTPATIENT)
Dept: ENDOCRINOLOGY CLINIC | Facility: CLINIC | Age: 59
End: 2024-07-02

## 2024-07-02 NOTE — TELEPHONE ENCOUNTER
Patient requesting lab orders to be faxed to [facility] at [phone] - patient is trying to register for labs.  Please call.  Thank you.

## 2024-07-12 ENCOUNTER — TELEPHONE (OUTPATIENT)
Dept: ENDOCRINOLOGY CLINIC | Facility: CLINIC | Age: 59
End: 2024-07-12

## 2024-07-12 DIAGNOSIS — E06.3 HYPOTHYROIDISM DUE TO HASHIMOTO'S THYROIDITIS: Primary | ICD-10-CM

## 2024-07-15 ENCOUNTER — OFFICE VISIT (OUTPATIENT)
Dept: DERMATOLOGY CLINIC | Facility: CLINIC | Age: 59
End: 2024-07-15

## 2024-07-15 DIAGNOSIS — L81.9 HYPERPIGMENTATION: ICD-10-CM

## 2024-07-15 DIAGNOSIS — D17.30 NEVUS LIPOMATOSUS CUTANEOUS SUPERFICIALIS: ICD-10-CM

## 2024-07-15 DIAGNOSIS — L82.1 DERMATOSIS PAPULOSA NIGRA: ICD-10-CM

## 2024-07-15 DIAGNOSIS — D23.9 DERMATOFIBROMA: Primary | ICD-10-CM

## 2024-07-15 DIAGNOSIS — L81.4 LENTIGO: ICD-10-CM

## 2024-07-15 PROCEDURE — 99203 OFFICE O/P NEW LOW 30 MIN: CPT | Performed by: DERMATOLOGY

## 2024-07-15 RX ORDER — MULTIVITAMIN WITH IRON
50 TABLET ORAL DAILY
COMMUNITY

## 2024-07-15 RX ORDER — LEVOTHYROXINE SODIUM 88 UG/1
88 TABLET ORAL
Qty: 90 TABLET | Refills: 1 | Status: SHIPPED | OUTPATIENT
Start: 2024-07-15

## 2024-07-15 NOTE — TELEPHONE ENCOUNTER
Dr. Garcia -- pt's thyroid results are in care everywhere. Completed on 7/2.     Component  Ref Range & Units 7/2/24  2:01 PM   TSH  0.270 - 4.200 u[IU]/mL 2.908     Component  Ref Range & Units 7/2/24  2:01 PM   T4, Free  0.55 - 1.60 ng/dL 1.01

## 2024-07-15 NOTE — PROGRESS NOTES
Joy Bolanos is a 59 year old female.    Chief Complaint   Patient presents with    Full Skin Exam     New pt present for full body exam. Pt denies any hx of skin CA. Pt present with a some moles on face an on her L nipple. No major concerns just have been popping up in the past 2 months.              Patient has no active allergies.  Current Outpatient Medications   Medication Sig Dispense Refill    vitamin B-12 50 MCG Oral Tab Take 1 tablet (50 mcg total) by mouth daily.      tretinoin 0.025 % External Cream Apply qohs to face as directed 20 g 3    levothyroxine 75 MCG Oral Tab Take 1 tablet (75 mcg total) by mouth. Take 1 tablet 6 days per week      ERGOCALCIFEROL 1.25 MG (91732 UT) Oral Cap TAKE 1 CAPSULE BY MOUTH 1 TIME A WEEK 13 capsule 3    GLUCOSAMINE-CHONDROITIN OR Take 1 tablet by mouth daily.        levothyroxine 88 MCG Oral Tab Take 1 tablet (88 mcg total) by mouth before breakfast. (Patient not taking: Reported on 7/15/2024) 90 tablet 1    alendronate 70 MG Oral Tab Take 1 tablet (70 mg total) by mouth once a week. (Patient not taking: Reported on 10/12/2023) 13 tablet 3    melatonin 3 MG Oral Tab Take by mouth nightly. 1-2 tablets (Patient not taking: Reported on 2024)        Past Medical History:    Allergic rhinitis    Allergic rhinitis, cause unspecified    Anxiety    Arthritis of left hand    Arthritis of right hand    CERVICAL DYSPLASIA    cryo    Elevated cholesterol    Hx of diseases NEC     sAB3    HYPOTHYROIDISM    Dr. Jaquez    Impaired fasting glucose    Iron deficiency anemia    GI workup with Dr. Arteaga    Labral tear of hip joint    Right    OTHER DISEASES    h/o infertility - IVF    Primary insomnia    Primary osteoarthritis of first carpometacarpal joint of left hand    Primary osteoarthritis of first carpometacarpal joint of right hand    SEASONAL ALLERGIES    ODIN (stress urinary incontinence, female)      Social History:  Social History     Socioeconomic History     Marital status:     Number of children: 3   Occupational History    Occupation: Homemaker   Tobacco Use    Smoking status: Never    Smokeless tobacco: Never   Vaping Use    Vaping status: Never Used   Substance and Sexual Activity    Alcohol use: Yes     Alcohol/week: 0.0 standard drinks of alcohol     Comment: Rare    Drug use: No    Sexual activity: Yes     Partners: Male     Birth control/protection: Pill     Comment: Current 10/26/2015   Other Topics Concern    Breast feeding No    Reaction to local anesthetic No    Pt has a pacemaker No    Pt has a defibrillator No                 Current Outpatient Medications   Medication Sig Dispense Refill    vitamin B-12 50 MCG Oral Tab Take 1 tablet (50 mcg total) by mouth daily.      tretinoin 0.025 % External Cream Apply qohs to face as directed 20 g 3    levothyroxine 75 MCG Oral Tab Take 1 tablet (75 mcg total) by mouth. Take 1 tablet 6 days per week      ERGOCALCIFEROL 1.25 MG (34508 UT) Oral Cap TAKE 1 CAPSULE BY MOUTH 1 TIME A WEEK 13 capsule 3    GLUCOSAMINE-CHONDROITIN OR Take 1 tablet by mouth daily.        levothyroxine 88 MCG Oral Tab Take 1 tablet (88 mcg total) by mouth before breakfast. (Patient not taking: Reported on 7/15/2024) 90 tablet 1    alendronate 70 MG Oral Tab Take 1 tablet (70 mg total) by mouth once a week. (Patient not taking: Reported on 10/12/2023) 13 tablet 3    melatonin 3 MG Oral Tab Take by mouth nightly. 1-2 tablets (Patient not taking: Reported on 2024)       Allergies:   No Active Allergies    Past Medical History:    Allergic rhinitis    Allergic rhinitis, cause unspecified    Anxiety    Arthritis of left hand    Arthritis of right hand    CERVICAL DYSPLASIA    cryo    Elevated cholesterol    Hx of diseases NEC     sAB3    HYPOTHYROIDISM    Dr. Jaquez    Impaired fasting glucose    Iron deficiency anemia    GI workup with Dr. Arteaga    Labral tear of hip joint    Right    OTHER DISEASES    h/o infertility - IVF     Primary insomnia    Primary osteoarthritis of first carpometacarpal joint of left hand    Primary osteoarthritis of first carpometacarpal joint of right hand    SEASONAL ALLERGIES    ODIN (stress urinary incontinence, female)     Past Surgical History:   Procedure Laterality Date      2005     delivery only      x 1    Colonoscopy      Negative with Dr. Bundy.  Repeat     Colonoscopy,diagnostic  2011    Performed by MARÍA ELENA BUNDY at [facility]    D & c      missed ab    Endometrial ablation  9/30/10    Thermachoice III    Hysterectomy      Ovarian cyst and uterine leiomyoma- Benign with Dr. Davenport    Hysteroscopy  9/30/10    Hysteroscopy, D&C, Thermachoice III Endometrial Ablation    Leep      Other surgical history      lapartomy for fibroids, she states\"lots of adhesions like PID\", one tube ligated & other one unable, Dr was Dr Kieran Patel at [facility]    Special service or report      IVF for infertility    Special service or report      Laparoscopy for infertility    Tubal ligation      Upper gi endoscopy - referral      Negative with Dr. Bundy    Upper gi endoscopy,biopsy  2011    Performed by MARÍA ELENA BUNDY at [facility]     Social History     Socioeconomic History    Marital status:      Spouse name: Not on file    Number of children: 3    Years of education: Not on file    Highest education level: Not on file   Occupational History    Occupation: Homemaker   Tobacco Use    Smoking status: Never    Smokeless tobacco: Never   Vaping Use    Vaping status: Never Used   Substance and Sexual Activity    Alcohol use: Yes     Alcohol/week: 0.0 standard drinks of alcohol     Comment: Rare    Drug use: No    Sexual activity: Yes     Partners: Male     Birth control/protection: Pill     Comment: Current 10/26/2015   Other Topics Concern    Grew up on a farm Not Asked    History of tanning Not Asked    Outdoor  occupation Not Asked    Breast feeding No    Reaction to local anesthetic No    Pt has a pacemaker No    Pt has a defibrillator No   Social History Narrative    Not on file     Social Determinants of Health     Financial Resource Strain: Not on file   Food Insecurity: Not on file   Transportation Needs: Not on file   Physical Activity: Not on file   Stress: Not on file   Social Connections: Not on file   Housing Stability: Not on file     Family History   Problem Relation Age of Onset    Hypertension Father     Thyroid Disorder Father     Arthritis Mother     Hypertension Mother     Cancer Maternal Grandmother     Pulmonary Disease Maternal Grandmother         pneumonia                      HPI :      Chief Complaint   Patient presents with    Full Skin Exam     New pt present for full body exam. Pt denies any hx of skin CA. Pt present with a some moles on face an on her L nipple. No major concerns just have been popping up in the past 2 months.      Patient concerned with skin lesion as above no personal or family history of skin cancer.  Has had biopsy in the past notes postinflammatory changes  Patient presents with concerns above.    Past notes/ records and appropriate/relevant lab results including pathology and past body maps reviewed. Updated and new information noted in current visit.       ROS:    Denies any other systemic complaints.  No fevers, chills, night sweats, sensitivity to the sun, deeper lumps or bumps.  No other skin complaints.  History, medications, allergies as noted.    Physical examination: Patient  well-developed well-nourished, alert oriented in no acute distress.  Exam of involved, appropriate areas of skin performed, including scalp, head, neck, face,nails, hair, external eyes, including conjunctival mucosa, eyelids, lips, external ears, back, chest, abdomen, arms, legs, palms.  Remarkable for lesions as noted   See map for details     ASSESSMENT AND PLAN:     Encounter Diagnoses   Name  Primary?    Dermatofibroma Yes    Lentigo     Dermatosis papulosa nigra     Nevus lipomatosus cutaneous superficialis     Hyperpigmentation        Assessment / plan:  Lesion of concern left lateral breast waxy tan keratotic Consistent benign seborrheic keratosis.  Scattered other lesions, small nevi noted over the trunk.  Dermal nodules consistent dermatofibroma as noted over the upper arm thigh.    Postinflammatory changes left lower leg with violaceous brown discoloration patient concern regarding this.  Discussed possible referral for laser [facility] if desired.  Monitor, no evidence recurrence of lesion.    Lentigines, small waxy papules over the face consistent with dermatosis papulosis nigra.  Early SKs,  Retinol alphahydroxy acids  Tretinoin may be helpful.  Retinoids: Application instructions reviewed gentle cleanser, over moisturizer.  Hyaluronic acid continue moisturizers may be helpful at reducing irritation.  Start 2-3 times weekly slowly titrate up, increase to nightly as tolerated May need to decrease use with cold weather.    Pathophysiology discussed with patient.  Therapeutic options reviewed.  See  Medications in grid.  Instructions reviewed at length.     General skin care questions answered.   Reassurance regarding benign skin lesions.Signs and symptoms of skin cancer, ABCDE's of melanoma briefly reviewed.  Sunscreen use (broad-spectrum, ideally mineral, UVA UVB coverage SPF 30 or greater recommended), sun protection, encouraged.  Followup as noted in rtc or p.r.n.    Encounter Times new patient  Including precharting, reviewing chart, prior notes obtaining history: 10 minutes, medical exam :10 minutes, notes on body map, plan, counseling 10minutes My total time spent caring for the patient on the day of the encounter: 30 minutes     The patient indicates understanding of these issues and agrees to the plan.  The patient is asked to return as noted in follow-up /as noted above    This note  was generated using Dragon voice recognition software.  Please contact me regarding any confusion resulting from errors in recognition.  Note to patient and family: The 21st Century Cures Act makes medical notes like these available to patients. However, be advised this is a medical document. It is intended as klqq-el-warz communication and monitoring of a patient's care needs. It is written in medical language and may contain abbreviations or verbiage that are unfamiliar. It may appear blunt or direct. Medical documents are intended to carry relevant information, facts as evident and the clinical opinion of the practitioner.  No orders of the defined types were placed in this encounter.      Meds & Refills for this Visit:   Requested Prescriptions     Signed Prescriptions Disp Refills    tretinoin 0.025 % External Cream 20 g 3     Sig: Apply qohs to face as directed       Encounter Diagnoses   Name Primary?    Dermatofibroma Yes    Lentigo     Dermatosis papulosa nigra     Nevus lipomatosus cutaneous superficialis        No orders of the defined types were placed in this encounter.      Results From Past 48 Hours:  No results found for this or any previous visit (from the past 48 hour(s)).    Meds This Visit:      Imaging Orders:  None     Referral Orders:  No orders of the defined types were placed in this encounter.

## 2024-07-16 NOTE — TELEPHONE ENCOUNTER
Faxed lab orders to [phone] as per pt request. Sent [facility] message.    Fax confirmation received.

## 2024-09-05 ENCOUNTER — TELEPHONE (OUTPATIENT)
Dept: ENDOCRINOLOGY CLINIC | Facility: CLINIC | Age: 59
End: 2024-09-05

## 2024-09-05 NOTE — TELEPHONE ENCOUNTER
Patient is requesting to get lab orders faxed to [facility] on [address] - fax # [phone]. Patient would like to get her lab tests done today.

## 2024-09-05 NOTE — TELEPHONE ENCOUNTER
Thyroid lab orders dtd 7/15/24 faxed [facility] (fax #[phone])  Spoke to patient to advise lab orders faxed - patient stated understanding and will complete lab

## 2024-09-11 ENCOUNTER — TELEPHONE (OUTPATIENT)
Dept: ENDOCRINOLOGY CLINIC | Facility: CLINIC | Age: 59
End: 2024-09-11

## 2024-09-11 DIAGNOSIS — E06.3 HYPOTHYROIDISM DUE TO HASHIMOTO'S THYROIDITIS: ICD-10-CM

## 2024-09-11 NOTE — TELEPHONE ENCOUNTER
Received Lab results (TSH) from [facility]. Placed in Dr. Garcia office for review.

## 2024-09-12 NOTE — TELEPHONE ENCOUNTER
Patient is calling again.  She is requesting a call because she does not normally check [employer] messages.  She also states that she is almost out of medication and needs to know if anything is going to be changed.  Please call

## 2024-09-13 RX ORDER — LEVOTHYROXINE SODIUM 88 UG/1
TABLET ORAL
Qty: 72 TABLET | Refills: 0 | Status: SHIPPED | OUTPATIENT
Start: 2024-09-13

## 2024-09-13 NOTE — TELEPHONE ENCOUNTER
Dr Garica- please see message below. Lab results placed in your folder for review. Pt is almost out of medication. There is a refill on file but pt would like guidance on dose.     Per TE 7/12:  TSH has increased compared to previous years and recommend we try higher dose. Increase Levothyroxine to 88mcg PO daily #90, refill 1 and recheck TSH, FT4 in 2 months. Thanks.

## 2024-09-13 NOTE — TELEPHONE ENCOUNTER
Ok, lets try the 88mcg 6 days per week for 4 weeks.  If her current symptoms do not improve then please contact the clinic and we will decrease to the 75. Thanks.

## 2024-09-13 NOTE — TELEPHONE ENCOUNTER
I called the patient and provided her with Dr. Garcia's further explanation below and stated ok to take 75 mcg 6 days per week.     However, the patient states she has been having insomnia and mild anxiety. She is not sure which course to take and would like to leave it up to dr garcia on what dose she thinks would be best at this time: 88 mcg 6 days per week or 75 mcg 6 days per week. She is not sure what decision to make.

## 2024-09-13 NOTE — TELEPHONE ENCOUNTER
Reviewed labs - on her most recent blood work she is getting slightly too much medication.  Just to confirm - is she taking 88mcg daily or 6 days per week?  If daily then please continue current dose but change to 6 days per week. Thanks.

## 2024-09-13 NOTE — TELEPHONE ENCOUNTER
Noted, instructions have been provided to the patient. Rx sent with new instructions as patient mentioned she was in need of refill and going out of town soon.     Endocrine refill protocol for medications for hypothyroidism and hyperthyroidism    Protocol Criteria:  PASSED   Appointment with Endocrinology completed in the last 12 months or scheduled in the next 6 months     Verify appointment has been completed or scheduled in the appropriate timeline. If so can send a 90 day supply with 1 refill per provider protocol.    Normal TSH result in the past 12 months   Review recent telephone encounters and mychart communications with patient to ensure a dose change has not occurred since last office visit that was not updated in the medication history list   Last completed office visit:7/1/2024 Eulalia Garcia MD   Next scheduled Follow up:   Future Appointments   Date Time Provider Department Center   10/15/2024  4:30 PM Yane Rivera MD [facility]      Last TSH result:   TSH   Date Value Ref Range Status   09/21/2023 1.630 0.358 - 3.740 mIU/mL Final     Comment:     This test may exhibit interference when a sample is collected from a person who is consuming high dose of biotin (a.k.a., vitamin B7, vitamin H, coenzyme R) supplements resulting in serum concentrations >100 ng/mL.  Intake of the recommended daily allowance (RDA) for biotin (0.03 mg) has not been shown to typically cause significant interference; however, high dose daily dietary supplements may contain biotin concentrations greater than 150 times (5-10 mg) the RDA.  It is recommended that physicians ask all patients who may be on biotin supplementation to stop biotin consumption at least 72 hours prior to collection of a new sample.     02/22/2018 0.416 0.350 - 5.500 uIU/mL Final   04/29/2010 1.570 0.450 - 4.500 uIU/mL Final   07/25/2008 0.036 (L) 0.350 - 5.500 uIU/mL Final     Comment:                    Adult TSH concentrations below 5.5  uIU/mL do not                 rule out the presence of subclinical hypothyroidism.

## 2024-09-13 NOTE — TELEPHONE ENCOUNTER
I called the patient and verified name and date of birth. Confirmed she has been taking levothyroxine 88 mcg every day. Provided recommendation to take 88 mcg 6 days per week.     The patient is confused as to why her dose was increased from 75 mg daily to 88 mcg daily back in July. She states her TSH was normal at that time. I did let her know per TE from 7/12 that Dr. Garcia was comparing to TSH results from previous years.     Patient would like to confirm if she needs to stay on the 88 mcg tablets or if she should go back down to the 75 mcg tablet. State she is going out of town and will need a refill today. Thanks.

## 2024-09-13 NOTE — TELEPHONE ENCOUNTER
I had changed the dose since when I saw her in the clinic she was describing some symptoms of hypothyroidism and TSH had increased from 1.3 to 2.9.  However if she does not note any difference and would prefer to go back to her normal dose that is fine.  We can go back to 75mcg 6 days per week. Thanks.

## 2024-10-01 ENCOUNTER — TELEPHONE (OUTPATIENT)
Dept: ENDOCRINOLOGY CLINIC | Facility: CLINIC | Age: 59
End: 2024-10-01

## 2024-10-01 NOTE — TELEPHONE ENCOUNTER
Patient is requesting lab orders to be fax to [facility] lab.    Fax # [phone]

## 2024-10-03 ENCOUNTER — TELEPHONE (OUTPATIENT)
Dept: INTERNAL MEDICINE CLINIC | Facility: CLINIC | Age: 59
End: 2024-10-03

## 2024-10-03 NOTE — TELEPHONE ENCOUNTER
Please let patient know I received her message in substance in the absence of Dr. Rivera.    In the absence of Paxlovid there is nothing specific to take but I can give some general recommendations    For body aches or generalized discomfort she can take Tylenol 325 mg tablet size.  1 or 2 tablets up to 3 times a day.  No more than 6 tablets a day.    2.    For head congestion and chest congestion she can take DayQuil or NyQuil.  People seem to think that is helps.    3.    She just wants something for nasal congestion and head congestion she can take over-the-counter Zyrtec.    4.    She should not mix Zyrtec with the DayQuil or NyQuil.  She should choose one or the other.    I hope the above helps.    If she gets any progressive symptoms such as progressive chest symptoms difficulty breathing shortness of breath she should be evaluated in the emergency room

## 2024-10-03 NOTE — TELEPHONE ENCOUNTER
To Dr GOMEZ ( Pt of Dr Lynn )    Pt triaged as below. Pt tested positive for Covid 10-2. Pt does not want Paxlovid.    Please advise on what pt can take for her symptoms.    [id number] - [place], [place] - [address] AT [address], [phone], [phone]   [address] [place] [address]   Phone: [phone] Fax: [phone]       URI Triage:    Fever: Yes[]        No[]        Unknown[]   []Temperature:   []Chills  [x]Night sweats  []Body aches    Cough: Yes[x]        No[]   []Productive cough  []Cough with exertion  [x]Dry cough    Respiratory Symptoms: Yes[]        No[x]   []Wheezing  []Pain with deep breathing  []SOB with exertion  []SOB at rest  []Heavy breathing  []Chest discomfort with deep breathing or coughing    GI Symptoms: Yes [x]     No[]   []Diarrhea  []Nausea  []Vomiting  []Abdominal pain  [x]Lack of appetite    Other symptoms:  []Sore throat  [x]Sinus pressure  [x]Nasal drainage  [x]Nasal congestion  []Chest congestion  []Head congestion  []PND  []Facial pain   []Earache   []Conjunctivitis  []Headache  [x]Fatigue  [x]Weakness  [x]Loss of sense of smell   [x]Loss of sense of taste    []OTC Medications:     []Any recent travel  [] Any sick contacts    []Positive Covid exposure (<6 feet, >15 min, no mask worn)  If yes, date of exposure (last contact):     [x]Covid Test  Date/Result:  10-2    Vaccinated (covid): Yes [x]    No[]   Booster:  Yes[x]   No[]     Symptom onset: 2 days ago        Patient was notified that this message will be routed to the physician to determine what their recommendation (s) would be. In the meantime, if they develop new or worsening symptoms, they were advised to call back or seek emergent evaluation at the ER. ER precautions reviewed.   Reviewed business hours and the after-hour service for the on-call physician, I.e, concerns/questions.

## 2024-10-03 NOTE — TELEPHONE ENCOUNTER
Patient called stating she tested positive for Covid as of yesterday.    Patient has appointment for Annual Physical on 10/15/24. She is wanting to know what protocol is for Covid.     Symptoms started on Tuesday.     Current Symptoms:  Sneezing  Congestion  Loss of Appetite  Fatigue  Sweaty    No Headache, No Fever, No body aches, No Chills, No weakness    Patient had Flu and Covid vaccine on same day about 1 month ago.     Patient #[phone]

## 2024-10-15 ENCOUNTER — OFFICE VISIT (OUTPATIENT)
Dept: INTERNAL MEDICINE CLINIC | Facility: CLINIC | Age: 59
End: 2024-10-15

## 2024-10-15 VITALS
WEIGHT: 148 LBS | DIASTOLIC BLOOD PRESSURE: 80 MMHG | OXYGEN SATURATION: 99 % | SYSTOLIC BLOOD PRESSURE: 118 MMHG | HEART RATE: 70 BPM | BODY MASS INDEX: 25.9 KG/M2 | HEIGHT: 63.5 IN | TEMPERATURE: 98 F

## 2024-10-15 DIAGNOSIS — M85.88 OSTEOPENIA OF LUMBAR SPINE: ICD-10-CM

## 2024-10-15 DIAGNOSIS — R73.01 IMPAIRED FASTING GLUCOSE: ICD-10-CM

## 2024-10-15 DIAGNOSIS — Z13.6 ENCOUNTER FOR SCREENING FOR CORONARY ARTERY DISEASE: ICD-10-CM

## 2024-10-15 DIAGNOSIS — Z00.00 ROUTINE HEALTH MAINTENANCE: ICD-10-CM

## 2024-10-15 DIAGNOSIS — Z12.31 SCREENING MAMMOGRAM FOR BREAST CANCER: Primary | ICD-10-CM

## 2024-10-15 DIAGNOSIS — E53.8 VITAMIN B12 DEFICIENCY: ICD-10-CM

## 2024-10-15 DIAGNOSIS — E78.00 HYPERCHOLESTEROLEMIA: ICD-10-CM

## 2024-10-15 DIAGNOSIS — R73.03 PREDIABETES: ICD-10-CM

## 2024-10-15 DIAGNOSIS — E03.9 ACQUIRED HYPOTHYROIDISM: ICD-10-CM

## 2024-10-15 PROCEDURE — 3079F DIAST BP 80-89 MM HG: CPT | Performed by: INTERNAL MEDICINE

## 2024-10-15 PROCEDURE — 99396 PREV VISIT EST AGE 40-64: CPT | Performed by: INTERNAL MEDICINE

## 2024-10-15 PROCEDURE — 3008F BODY MASS INDEX DOCD: CPT | Performed by: INTERNAL MEDICINE

## 2024-10-15 PROCEDURE — 3074F SYST BP LT 130 MM HG: CPT | Performed by: INTERNAL MEDICINE

## 2024-10-15 RX ORDER — LEVOTHYROXINE SODIUM 75 UG/1
TABLET ORAL
COMMUNITY
Start: 2024-09-15

## 2024-10-15 NOTE — PROGRESS NOTES
Joy Bolanos is a 59 year old female.  Chief Complaint   Patient presents with    Physical       HPI:   Joy Bolanos is a 59 year old female new pt who presents for a complete physical exam.    Feels well  Asking about lipoprotein A.       SocHx:  , 3 kids (incl twin boys)  Never smoked  Went back to work    FamHx:  No colon/breast cancer, no diabetes       Wt Readings from Last 6 Encounters:   10/15/24 148 lb (67.1 kg)   24 148 lb (67.1 kg)   24 147 lb (66.7 kg)   10/12/23 148 lb (67.1 kg)   23 146 lb (66.2 kg)   11/10/22 135 lb (61.2 kg)     Body mass index is 25.81 kg/m².       Current Outpatient Medications   Medication Sig Dispense Refill    levothyroxine 75 MCG Oral Tab 6 days/week      vitamin B-12 50 MCG Oral Tab Take 1 tablet (50 mcg total) by mouth daily.      tretinoin 0.025 % External Cream Apply qohs to face as directed 20 g 3    ERGOCALCIFEROL 1.25 MG (30817 UT) Oral Cap TAKE 1 CAPSULE BY MOUTH 1 TIME A WEEK 13 capsule 3    melatonin 3 MG Oral Tab Take by mouth as needed. 1-2 tablets      GLUCOSAMINE-CHONDROITIN OR Take 1 tablet by mouth daily.        alendronate 70 MG Oral Tab Take 1 tablet (70 mg total) by mouth once a week. (Patient not taking: Reported on 10/15/2024) 13 tablet 3      Past Medical History:    Allergic rhinitis    Allergic rhinitis, cause unspecified    Anxiety    Arthritis of left hand    Arthritis of right hand    CERVICAL DYSPLASIA    cryo    Elevated cholesterol    Hx of diseases NEC     sAB3    HYPOTHYROIDISM    Dr. Jaquez    Impaired fasting glucose    Iron deficiency anemia    GI workup with Dr. Arteaga    Labral tear of hip joint    Right    OTHER DISEASES    h/o infertility - IVF    Primary insomnia    Primary osteoarthritis of first carpometacarpal joint of left hand    Primary osteoarthritis of first carpometacarpal joint of right hand    SEASONAL ALLERGIES    ODIN (stress urinary incontinence, female)      Past Surgical History:    Procedure Laterality Date           delivery only      x 1    Colonoscopy      Negative with Dr. Bundy.  Repeat     Colonoscopy,diagnostic  2011    Performed by MARÍA ELENA BUNDY at [facility]    D & c      missed ab    Endometrial ablation  9/30/10    Thermachoice III    Hysterectomy      Ovarian cyst and uterine leiomyoma- Benign with Dr. Davenport    Hysteroscopy  9/30/10    Hysteroscopy, D&C, Thermachoice III Endometrial Ablation    Leep      Other surgical history      lapartomy for fibroids, she states\"lots of adhesions like PID\", one tube ligated & other one unable, Dr was Dr Kieran Patel at [facility]    Special service or report      IVF for infertility    Special service or report      Laparoscopy for infertility    Tubal ligation      Upper gi endoscopy - referral      Negative with Dr. Bundy    Upper gi endoscopy,biopsy  2011    Performed by MARÍA ELENA BUNDY at [facility]      Family History   Problem Relation Age of Onset    Hypertension Father     Thyroid Disorder Father     Arthritis Mother     Hypertension Mother     Cancer Maternal Grandmother     Pulmonary Disease Maternal Grandmother         pneumonia      Social History:   Social History     Socioeconomic History    Marital status:     Number of children: 3   Occupational History    Occupation: Homemaker   Tobacco Use    Smoking status: Never     Passive exposure: Never    Smokeless tobacco: Never   Vaping Use    Vaping status: Never Used   Substance and Sexual Activity    Alcohol use: Yes     Alcohol/week: 1.0 standard drink of alcohol     Types: 1 Standard drinks or equivalent per week     Comment: Rare    Drug use: No    Sexual activity: Yes     Partners: Male     Birth control/protection: Pill     Comment: Current 10/26/2015   Other Topics Concern    Breast feeding No    Reaction to local anesthetic No    Pt has a pacemaker No    Pt has a  defibrillator No          REVIEW OF SYSTEMS:   GENERAL: feels well otherwise  SKIN: denies any unusual skin lesions  EYES:denies blurred vision or double vision  HEENT: denies nasal congestion, sinus pain or ST  LUNGS: denies shortness of breath with exertion or cough  CARDIOVASCULAR: denies chest pain, pressure, or palpitations  GI: denies abdominal pain, nausea, vomiting, diarrhea, constipation, hematochezia, or melena  NEURO: denies headaches or dizziness    EXAM:   /80   Pulse 70   Temp 97.8 °F (36.6 °C) (Oral)   Ht 5' 3.5\" (1.613 m)   Wt 148 lb (67.1 kg)   LMP 10/11/2010   SpO2 99%   BMI 25.81 kg/m²     GENERAL: well developed, well nourished, in no apparent distress  HEENT: normal oropharynx, normal TM's  EYES: PERRLA, EOMI, conjunctivae are pink  NECK: supple, no cervical or supraclavicular LAD, no carotid bruits  BREAST: no dominant or suspicious mass, no axillary LAD  LUNGS: clear to auscultation  CARDIO: RRR, normal S1S2, no gallops or murmurs  GI: soft, NT, ND, NABS, no HSM  EXTREMITIES: no cyanosis, clubbing or edema, +2 DP pulses        ASSESSMENT AND PLAN:     Routine health maintenance  -Pt is s/p HAYDEN/BSO  -Mammo normal 10/27/22; repeat ordered  -Colonoscopy normal 12/20/11 (Dr. Tommy Arteaga); repeat colonoscopy 11/23/21 -- one polyp (though path negative; fragments of colonic mucosa) -- repeat in 5 years per Dr. Gayle Chino's note (though rec that pt check to see if still needs 5 year repeat).   -Had both Shingrix shingles vaccine.   -Tdap done 11/2021   -check labs  -sees derm Dr. Quigley  -had flu/covid vax  -discussed exercise, healthy low-carb diet, wt loss at length    Postmenopausal atrophic vaginitis  -have rec Replens.  Could consider vaginal estrogen if needed    Prediabetes/IGT  Encouraged limiting carbs, getting regular exercise  Sees ophtho  Check HgbA1c    Hypercholesterolemia  -check lipids  -cont healthy diet    Hypothyroidism  -sees endocrine Dr. Garcia  -TSH done  9/2024    Severe osteopenia  -started on fosamax 70mg/week on 12/9/20 after DEXA in 11/2020 showed a T score of -2.4  -endocrinologist ordered a repeat DEXA in 6/2021 -- T score spine improved from -2.4 to -1.1, though discussed with pt that it was a different machine, so difficult to compare.  -repeat DEXA 10/3/23 was normal in femur (0.7) and spine (1.0)  -cont weekly vitamin D 50,000 units; level normal (48)  -cont calcium (1200mg/day total)  -cont regular weight-bearing exercise  -stopped fosamax in 10/2023  -repeat DEXA (at [facility]/same facility) in 2 years (10/2025)    Vitamin B12 deficiency  -Vit B12 500 mcg/day  -check level    RTC 1 yr.

## 2024-10-16 ENCOUNTER — LAB ENCOUNTER (OUTPATIENT)
Dept: LAB | Age: 59
End: 2024-10-16
Attending: INTERNAL MEDICINE
Payer: COMMERCIAL

## 2024-10-16 DIAGNOSIS — E53.8 VITAMIN B12 DEFICIENCY: ICD-10-CM

## 2024-10-16 DIAGNOSIS — Z13.6 ENCOUNTER FOR SCREENING FOR CORONARY ARTERY DISEASE: ICD-10-CM

## 2024-10-16 DIAGNOSIS — Z00.00 ROUTINE HEALTH MAINTENANCE: ICD-10-CM

## 2024-10-16 DIAGNOSIS — E78.00 HYPERCHOLESTEROLEMIA: ICD-10-CM

## 2024-10-16 DIAGNOSIS — M85.88 OSTEOPENIA OF LUMBAR SPINE: ICD-10-CM

## 2024-10-16 DIAGNOSIS — R73.03 PREDIABETES: ICD-10-CM

## 2024-10-16 DIAGNOSIS — E06.3 HYPOTHYROIDISM DUE TO HASHIMOTO'S THYROIDITIS: ICD-10-CM

## 2024-10-16 LAB
ALBUMIN SERPL-MCNC: 4.4 G/DL (ref 3.2–4.8)
ALBUMIN/GLOB SERPL: 1.7 {RATIO} (ref 1–2)
ALP LIVER SERPL-CCNC: 61 U/L
ALT SERPL-CCNC: 11 U/L
ANION GAP SERPL CALC-SCNC: 6 MMOL/L (ref 0–18)
AST SERPL-CCNC: 17 U/L (ref ?–34)
BASOPHILS # BLD AUTO: 0.03 X10(3) UL (ref 0–0.2)
BASOPHILS NFR BLD AUTO: 0.5 %
BILIRUB SERPL-MCNC: 0.7 MG/DL (ref 0.2–1.1)
BUN BLD-MCNC: 12 MG/DL (ref 9–23)
BUN/CREAT SERPL: 14.6 (ref 10–20)
CALCIUM BLD-MCNC: 9.4 MG/DL (ref 8.7–10.4)
CHLORIDE SERPL-SCNC: 109 MMOL/L (ref 98–112)
CHOLEST SERPL-MCNC: 207 MG/DL (ref ?–200)
CO2 SERPL-SCNC: 28 MMOL/L (ref 21–32)
CREAT BLD-MCNC: 0.82 MG/DL
DEPRECATED RDW RBC AUTO: 41.9 FL (ref 35.1–46.3)
EGFRCR SERPLBLD CKD-EPI 2021: 82 ML/MIN/1.73M2 (ref 60–?)
EOSINOPHIL # BLD AUTO: 0.15 X10(3) UL (ref 0–0.7)
EOSINOPHIL NFR BLD AUTO: 2.7 %
ERYTHROCYTE [DISTWIDTH] IN BLOOD BY AUTOMATED COUNT: 12.7 % (ref 11–15)
EST. AVERAGE GLUCOSE BLD GHB EST-MCNC: 117 MG/DL (ref 68–126)
FASTING PATIENT LIPID ANSWER: YES
FASTING STATUS PATIENT QL REPORTED: YES
GLOBULIN PLAS-MCNC: 2.6 G/DL (ref 2–3.5)
GLUCOSE BLD-MCNC: 90 MG/DL (ref 70–99)
HBA1C MFR BLD: 5.7 % (ref ?–5.7)
HCT VFR BLD AUTO: 46.4 %
HDLC SERPL-MCNC: 60 MG/DL (ref 40–59)
HGB BLD-MCNC: 15.3 G/DL
IMM GRANULOCYTES # BLD AUTO: 0.01 X10(3) UL (ref 0–1)
IMM GRANULOCYTES NFR BLD: 0.2 %
LDLC SERPL CALC-MCNC: 132 MG/DL (ref ?–100)
LYMPHOCYTES # BLD AUTO: 1.63 X10(3) UL (ref 1–4)
LYMPHOCYTES NFR BLD AUTO: 29.2 %
MCH RBC QN AUTO: 29.5 PG (ref 26–34)
MCHC RBC AUTO-ENTMCNC: 33 G/DL (ref 31–37)
MCV RBC AUTO: 89.4 FL
MONOCYTES # BLD AUTO: 0.3 X10(3) UL (ref 0.1–1)
MONOCYTES NFR BLD AUTO: 5.4 %
NEUTROPHILS # BLD AUTO: 3.46 X10 (3) UL (ref 1.5–7.7)
NEUTROPHILS # BLD AUTO: 3.46 X10(3) UL (ref 1.5–7.7)
NEUTROPHILS NFR BLD AUTO: 62 %
NONHDLC SERPL-MCNC: 147 MG/DL (ref ?–130)
OSMOLALITY SERPL CALC.SUM OF ELEC: 295 MOSM/KG (ref 275–295)
PLATELET # BLD AUTO: 260 10(3)UL (ref 150–450)
POTASSIUM SERPL-SCNC: 3.9 MMOL/L (ref 3.5–5.1)
PROT SERPL-MCNC: 7 G/DL (ref 5.7–8.2)
RBC # BLD AUTO: 5.19 X10(6)UL
SODIUM SERPL-SCNC: 143 MMOL/L (ref 136–145)
T4 FREE SERPL-MCNC: 1.2 NG/DL (ref 0.8–1.7)
TRIGL SERPL-MCNC: 82 MG/DL (ref 30–149)
TSI SER-ACNC: 2.04 MIU/ML (ref 0.55–4.78)
VIT B12 SERPL-MCNC: >2000 PG/ML (ref 211–911)
VIT D+METAB SERPL-MCNC: 77.8 NG/ML (ref 30–100)
VLDLC SERPL CALC-MCNC: 15 MG/DL (ref 0–30)
WBC # BLD AUTO: 5.6 X10(3) UL (ref 4–11)

## 2024-10-16 PROCEDURE — 80053 COMPREHEN METABOLIC PANEL: CPT

## 2024-10-16 PROCEDURE — 36415 COLL VENOUS BLD VENIPUNCTURE: CPT

## 2024-10-16 PROCEDURE — 80061 LIPID PANEL: CPT

## 2024-10-16 PROCEDURE — 84443 ASSAY THYROID STIM HORMONE: CPT

## 2024-10-16 PROCEDURE — 83695 ASSAY OF LIPOPROTEIN(A): CPT

## 2024-10-16 PROCEDURE — 82306 VITAMIN D 25 HYDROXY: CPT

## 2024-10-16 PROCEDURE — 83036 HEMOGLOBIN GLYCOSYLATED A1C: CPT | Performed by: INTERNAL MEDICINE

## 2024-10-16 PROCEDURE — 82607 VITAMIN B-12: CPT

## 2024-10-16 PROCEDURE — 85025 COMPLETE CBC W/AUTO DIFF WBC: CPT

## 2024-10-16 PROCEDURE — 84439 ASSAY OF FREE THYROXINE: CPT

## 2024-10-17 ENCOUNTER — TELEPHONE (OUTPATIENT)
Dept: ENDOCRINOLOGY CLINIC | Facility: CLINIC | Age: 59
End: 2024-10-17

## 2024-10-17 ENCOUNTER — TELEPHONE (OUTPATIENT)
Dept: INTERNAL MEDICINE CLINIC | Facility: CLINIC | Age: 59
End: 2024-10-17

## 2024-10-17 LAB — LIPOPROTEIN (A): 30 NMOL/L

## 2024-10-17 NOTE — TELEPHONE ENCOUNTER
Patient is calling looking for the lab test results.  Please call and advise.    Patient is also asking if the doctor received her 2023 mammogram results.  Patient states they were going to be faxed over yesterday 10/16/2024.    Please advise

## 2024-10-18 NOTE — TELEPHONE ENCOUNTER
I sent her a [employer] message yesterday re the labs.  To summarize:     Labs are overall fine.     Hemoglobin A1c is still in the early prediabetes range but is stable.  LDL is minimally elevated.  No meds indicated -- just continued to limit sugar, bad starches and increase exercise.     Lipoprotein A is normal

## 2024-10-18 NOTE — TELEPHONE ENCOUNTER
Dr. Garcia -- do you want pt to repeat TSH and FT4 prior to visit in July 2025? Recent labs at goal    Per LOV on 7/1/24: RTC 1 year. Follow up scheduled 7/14/25.     Provided results written below by MD. Pt verbalized understanding.     Per TSH and FT4 lab result comment by MD Garcia:   Good news, your thyroid levels are normal and at goal.  Please continue current dose of medication and contact the clinic with any questions or concerns.  Dr. Garcia   Written by Eulalia Garcia MD on 10/17/2024  2:33 PM CDT  Seen by patient Joy Bolanos on 10/17/2024  4:45 PM

## 2024-10-18 NOTE — TELEPHONE ENCOUNTER
I spoke to patient and relayed Dr Rivera's message to her. She verbalized understanding.    She asked if you received the mammogram report from 2023, she asked for it to be faxed yesterday?  She is going next week to have her mammogram and you should get that report faxed as well.    To Dr Rivera---

## 2024-10-22 NOTE — TELEPHONE ENCOUNTER
MC sent advising patient labs do not need to be repeated unless symptomatic - patient advised to contact clinic with questions

## 2024-10-31 ENCOUNTER — TELEPHONE (OUTPATIENT)
Dept: INTERNAL MEDICINE CLINIC | Facility: CLINIC | Age: 59
End: 2024-10-31

## 2024-10-31 NOTE — TELEPHONE ENCOUNTER
Patient requests call back from Dr Gallego today to discuss mammogram results she's received in my chart

## 2025-01-13 RX ORDER — ERGOCALCIFEROL 1.25 MG/1
CAPSULE, LIQUID FILLED ORAL
Qty: 13 CAPSULE | Refills: 3 | Status: SHIPPED | OUTPATIENT
Start: 2025-01-13

## 2025-01-13 NOTE — TELEPHONE ENCOUNTER
Refill request is for a maintenance medication and has met the criteria specified in the Ambulatory Medication Refill Standing Order for eligibility, visits, laboratory, alerts and was sent to the requested pharmacy.    Requested Prescriptions     Signed Prescriptions Disp Refills    ERGOCALCIFEROL 1.25 MG (88266 UT) Oral Cap 13 capsule 3     Sig: TAKE 1 CAPSULE BY MOUTH 1 TIME A WEEK     Authorizing Provider: PAM KEE     Ordering User: LOKESH TOUSSAINT

## 2025-03-25 NOTE — TELEPHONE ENCOUNTER
Thyroid lab orders faxed again  MC sent updating patient   Plan: RTC in 6 months Detail Level: Zone Continue Regimen: Clindamycin lotion 1% QAM Discontinue Regimen: Doxycycline 100mg QD

## 2025-05-05 ENCOUNTER — TELEPHONE (OUTPATIENT)
Dept: ENDOCRINOLOGY CLINIC | Facility: CLINIC | Age: 60
End: 2025-05-05

## 2025-05-05 DIAGNOSIS — E06.3 HYPOTHYROIDISM DUE TO HASHIMOTO'S THYROIDITIS: Primary | ICD-10-CM

## 2025-05-05 RX ORDER — LEVOTHYROXINE SODIUM 75 UG/1
TABLET ORAL
Qty: 78 TABLET | Refills: 1 | Status: SHIPPED | OUTPATIENT
Start: 2025-05-05

## 2025-05-05 NOTE — TELEPHONE ENCOUNTER
Endocrine refill protocol for medications for hypothyroidism and hyperthyroidism    Protocol Criteria:  PASSED     If all below requirements are met, send a 90-day supply with 1 refill per provider protocol.    Verify appointment with Endocrinology completed in the last 12 months or scheduled in the next 6 months.    Normal TSH result in the past 12 months   Review recent telephone encounters and mychart communications with patient to ensure a dose change has not occurred since last office visit that was not updated in the medication history list     Last completed office visit:7/1/2024 Eulalia Garcia MD   Next scheduled Follow up:   Future Appointments   Date Time Provider Department Center   7/14/2025  4:30 PM Eulalia Garcia MD [facility]      Last TSH result:   TSH   Date Value Ref Range Status   10/16/2024 2.042 0.550 - 4.780 mIU/mL Final   02/22/2018 0.416 0.350 - 5.500 uIU/mL Final   04/29/2010 1.570 0.450 - 4.500 uIU/mL Final   07/25/2008 0.036 (L) 0.350 - 5.500 uIU/mL Final     Comment:                    Adult TSH concentrations below 5.5 uIU/mL do not                 rule out the presence of subclinical hypothyroidism.     Unable to LM; phone rings, then disconnects   MC sent  Rx sent

## 2025-05-06 ENCOUNTER — TELEPHONE (OUTPATIENT)
Dept: ENDOCRINOLOGY CLINIC | Facility: CLINIC | Age: 60
End: 2025-05-06

## 2025-05-06 DIAGNOSIS — E06.3 HYPOTHYROIDISM DUE TO HASHIMOTO'S THYROIDITIS: Primary | ICD-10-CM

## 2025-05-06 NOTE — TELEPHONE ENCOUNTER
Dr. Garcia --    Pt is requesting lab orders because she has been having a hard time sleeping lately. This has been going on for a few weeks now. Last thyroid labs was on 10/16/24. Order pended.   Component      Latest Ref Rng 10/16/2024   T4,Free (Direct)      0.8 - 1.7 ng/dL 1.2    TSH      0.550 - 4.780 mIU/mL 2.042

## 2025-05-06 NOTE — TELEPHONE ENCOUNTER
Patient wanted to know if she is due for labs and if she is, requesting orders.  Please call.  Thank you.

## 2025-05-07 NOTE — TELEPHONE ENCOUNTER
Contacted patient and went over medication list, pharmacy and list of allergies. Patient confirmed visit for tomorrow and encouraged to complete precheck in process.    Pt notified that lab order was placed by MD via [facility]. RN advised she doesn't need to fast.

## 2025-05-08 ENCOUNTER — TELEPHONE (OUTPATIENT)
Dept: ENDOCRINOLOGY CLINIC | Facility: CLINIC | Age: 60
End: 2025-05-08

## 2025-05-12 ENCOUNTER — PATIENT MESSAGE (OUTPATIENT)
Dept: ENDOCRINOLOGY CLINIC | Facility: CLINIC | Age: 60
End: 2025-05-12

## 2025-05-12 ENCOUNTER — LAB ENCOUNTER (OUTPATIENT)
Dept: LAB | Facility: HOSPITAL | Age: 60
End: 2025-05-12
Attending: INTERNAL MEDICINE
Payer: COMMERCIAL

## 2025-05-12 DIAGNOSIS — E06.3 HYPOTHYROIDISM DUE TO HASHIMOTO'S THYROIDITIS: ICD-10-CM

## 2025-05-12 DIAGNOSIS — E06.3 HYPOTHYROIDISM DUE TO HASHIMOTO'S THYROIDITIS: Primary | ICD-10-CM

## 2025-05-12 LAB
T4 FREE SERPL-MCNC: 1.2 NG/DL (ref 0.8–1.7)
TSI SER-ACNC: 4.09 UIU/ML (ref 0.55–4.78)

## 2025-05-12 PROCEDURE — 36415 COLL VENOUS BLD VENIPUNCTURE: CPT

## 2025-05-12 PROCEDURE — 84439 ASSAY OF FREE THYROXINE: CPT

## 2025-05-12 PROCEDURE — 84443 ASSAY THYROID STIM HORMONE: CPT

## 2025-05-13 ENCOUNTER — TELEPHONE (OUTPATIENT)
Dept: INTERNAL MEDICINE CLINIC | Facility: CLINIC | Age: 60
End: 2025-05-13

## 2025-05-13 ENCOUNTER — PATIENT MESSAGE (OUTPATIENT)
Dept: INTERNAL MEDICINE CLINIC | Facility: CLINIC | Age: 60
End: 2025-05-13

## 2025-05-14 ENCOUNTER — TELEPHONE (OUTPATIENT)
Dept: INTERNAL MEDICINE CLINIC | Facility: CLINIC | Age: 60
End: 2025-05-14

## 2025-05-14 RX ORDER — LEVOTHYROXINE SODIUM 88 UG/1
88 TABLET ORAL
Qty: 90 TABLET | Refills: 1 | Status: SHIPPED | OUTPATIENT
Start: 2025-05-14

## 2025-05-21 ENCOUNTER — TELEPHONE (OUTPATIENT)
Dept: ENDOCRINOLOGY CLINIC | Facility: CLINIC | Age: 60
End: 2025-05-21

## 2025-05-21 NOTE — TELEPHONE ENCOUNTER
Patient calling regarding change of prescription levothyroxine. Patient is still having trouble sleeping and asking when can she expect improvement. Patient says she was also advise to take Creatine, and asking what brand and how often. Patient already taking Vitamin D [id number]. Please call at [phone],thanks.

## 2025-06-04 NOTE — TELEPHONE ENCOUNTER
Her TSH is slightly high which would not cause trouble sleeping but can cause some fatigue.  If she is more concerned about insomnia then would not recommend increasing dose.  We can also discuss at her upcoming visit.  The creatinine is not necessary so no need to take if she prefers to hold off. Thanks.

## 2025-06-04 NOTE — TELEPHONE ENCOUNTER
Called the patient. Verified name and date of birth. She is at work so cannot confirm her current dose of levothyroxine. She states whatever was last refilled is what she is taking.         She states she is still having trouble sleeping although it is a little better. Unsure if she needs to change her dose. She has always taken melatonin and continues to do so.     She states she was provided with bullet points on mychart from Dr. Garcia on creatine. She wants to know more information about it. Why it will help, side effects, reactions, how it works, brand, how much to take, if she really needs it, ect.

## 2025-07-11 RX ORDER — ERGOCALCIFEROL 1.25 MG/1
1.25 CAPSULE ORAL
COMMUNITY

## 2025-07-11 RX ORDER — ALENDRONATE SODIUM 70 MG/1
70 TABLET ORAL
COMMUNITY

## 2025-07-11 RX ORDER — HYDROCORTISONE ACETATE 0.5 %
CREAM (GRAM) TOPICAL
COMMUNITY

## 2025-07-11 RX ORDER — LEVOTHYROXINE SODIUM 50 UG/1
50 CAPSULE ORAL
COMMUNITY

## 2025-07-11 RX ORDER — LEVOTHYROXINE SODIUM 100 UG/1
100 CAPSULE ORAL
COMMUNITY

## 2025-07-14 ENCOUNTER — APPOINTMENT (OUTPATIENT)
Age: 60
End: 2025-07-14

## 2025-07-14 ENCOUNTER — OFFICE VISIT (OUTPATIENT)
Dept: ENDOCRINOLOGY CLINIC | Facility: CLINIC | Age: 60
End: 2025-07-14

## 2025-07-14 VITALS
BODY MASS INDEX: 25 KG/M2 | SYSTOLIC BLOOD PRESSURE: 107 MMHG | DIASTOLIC BLOOD PRESSURE: 73 MMHG | HEART RATE: 63 BPM | WEIGHT: 143 LBS

## 2025-07-14 DIAGNOSIS — R63.5 WEIGHT GAIN: ICD-10-CM

## 2025-07-14 DIAGNOSIS — E06.3 HYPOTHYROIDISM DUE TO HASHIMOTO'S THYROIDITIS: Primary | ICD-10-CM

## 2025-07-14 PROCEDURE — 3074F SYST BP LT 130 MM HG: CPT | Performed by: INTERNAL MEDICINE

## 2025-07-14 PROCEDURE — 3078F DIAST BP <80 MM HG: CPT | Performed by: INTERNAL MEDICINE

## 2025-07-14 PROCEDURE — 99213 OFFICE O/P EST LOW 20 MIN: CPT | Performed by: INTERNAL MEDICINE

## 2025-07-14 NOTE — PROGRESS NOTES
Name: Joy Bolanos  Date: 7/14/2025    Referring Physician: No ref. provider found    Chief Complaint   Patient presents with    Hypothyroidism       HISTORY OF PRESENT ILLNESS   Joy Bolanos is a 60 year old female who presents for   Chief Complaint   Patient presents with    Hypothyroidism     59 y/o F presents for initial evaluation of hypothyroidism diagnosed approximately 30 years ago during pregnancy.  Since that time she has been maintained on long term thyroid hormone.     She is maintained on Levothyroxine 88mcg PO daily, taking in AM and waiting 30-60 min before eating.  Fatigue is slightly better.  Weight is stable. She does note increased hair loss.     Diet: trying to decrease CHO amount, low carb bread, although more cheating since kids get home from school     DEXA normal 2023    Thyroid US negative for nodules 2021     REVIEW OF SYSTEMS  The ROS was updated during office visit 7/14/2025 and updates noted below and in the HPI.     Eyes: no change in vision  Neurologic: no headache, generalized or focal weakness or numbness.  Head: normal  ENT: normal  Lungs: no shortness of breath, wheezing or CASTELLON  Cardiovascular:  no chest pain or palpitations  Gastrointestinal:  no abdominal pain, bowel movement problems  Musculoskeletal: no muscle pain or arthralgia  /Gyne: no frequency or discomfort while urinating  Psychiatric:  no acute distress, anxiety  or depression  Skin: normal moisturized skin    Medications:     Current Outpatient Medications:     levothyroxine 88 MCG Oral Tab, Take 1 tablet (88 mcg total) by mouth before breakfast., Disp: 90 tablet, Rfl: 1    ERGOCALCIFEROL 1.25 MG (27600 UT) Oral Cap, TAKE 1 CAPSULE BY MOUTH 1 TIME A WEEK, Disp: 13 capsule, Rfl: 3    vitamin B-12 50 MCG Oral Tab, Take 1 tablet (50 mcg total) by mouth daily., Disp: , Rfl:     tretinoin 0.025 % External Cream, Apply qohs to face as directed, Disp: 20 g, Rfl: 3    melatonin 3 MG Oral Tab, Take by  mouth as needed. 1-2 tablets, Disp: , Rfl:     GLUCOSAMINE-CHONDROITIN OR, Take 1 tablet by mouth daily.  , Disp: , Rfl:     levothyroxine 75 MCG Oral Tab, Take 1 tablet by mouth daily 6 days/week. (Patient not taking: Reported on 2025), Disp: 78 tablet, Rfl: 1    alendronate 70 MG Oral Tab, Take 1 tablet (70 mg total) by mouth once a week. (Patient not taking: Reported on 2025), Disp: 13 tablet, Rfl: 3     Allergies:   No Known Allergies    Social History:   Social History     Socioeconomic History    Marital status:     Number of children: 3   Occupational History    Occupation: Homemaker   Tobacco Use    Smoking status: Never     Passive exposure: Never    Smokeless tobacco: Never   Vaping Use    Vaping status: Never Used   Substance and Sexual Activity    Alcohol use: Yes     Alcohol/week: 1.0 standard drink of alcohol     Types: 1 Standard drinks or equivalent per week     Comment: Rare    Drug use: No    Sexual activity: Yes     Partners: Male     Birth control/protection: Pill     Comment: Current 10/26/2015   Other Topics Concern    Breast feeding No    Reaction to local anesthetic No    Pt has a pacemaker No    Pt has a defibrillator No       Medical History:   Past Medical History:    Allergic rhinitis    Allergic rhinitis, cause unspecified    Anxiety    Arthritis of left hand    Arthritis of right hand    CERVICAL DYSPLASIA    cryo    Elevated cholesterol    Hx of diseases NEC     sAB3    HYPOTHYROIDISM    Dr. Jaquez    Impaired fasting glucose    Iron deficiency anemia    GI workup with Dr. Arteaga    Labral tear of hip joint    Right    OTHER DISEASES    h/o infertility - IVF    Primary insomnia    Primary osteoarthritis of first carpometacarpal joint of left hand    Primary osteoarthritis of first carpometacarpal joint of right hand    SEASONAL ALLERGIES    ODIN (stress urinary incontinence, female)       Surgical history:   Past Surgical History:   Procedure Laterality Date            delivery only      x 1    Colonoscopy      Negative with Dr. Bundy.  Repeat     Colonoscopy,diagnostic  2011    Performed by MARÍA ELENA BUNDY at [facility]    D & c      missed ab    Endometrial ablation  9/30/10    Thermachoice III    Hysterectomy  -    Ovarian cyst and uterine leiomyoma- Benign with Dr. Davenport    Hysteroscopy  9/30/10    Hysteroscopy, D&C, Thermachoice III Endometrial Ablation    Leep      Other surgical history      lapartomy for fibroids, she states\"lots of adhesions like PID\", one tube ligated & other one unable, Dr was Dr Kieran Patel at [facility]    Special service or report      IVF for infertility    Special service or report      Laparoscopy for infertility    Tubal ligation      Upper gi endoscopy - referral      Negative with Dr. Bundy    Upper gi endoscopy,biopsy  2011    Performed by MARÍA ELENA BUNDY at [facility]       PHYSICAL EXAMINATION:  /73   Pulse 63   Wt 143 lb (64.9 kg)   LMP 10/11/2010   BMI 24.93 kg/m²     General Appearance:  Alert, in no acute distress, well developed  Eyes: normal conjunctivae, sclera.  Ears/Nose/Mouth/Throat/Neck:  normal hearing, normal speech   Musculoskeletal:  normal muscle strength and tone  PV: normal pulses of carotids, pedals  Skin:  normal moisture and skin texture  Hair & Nails:  normal scalp hair     Neuro:  sensory grossly intact and motor grossly intact  Psychiatric:  oriented to time, self, and place  Nutritional:  no abnormal weight gain or loss    ASSESSMENT/PLAN:    Hypothyroidism  - Discussed importance of long term treatment  - Decrease Levothyroxine 88mcg 6 days per week   - Discussed taking in AM and waiting 30-60 min before eating  - Recheck TSH, FT4 in 2 months   -Thyroid US negative for nodules, small gland    - Discussed decreasing CHO amount and calories for weight loss  - Refer to RD     RTC 1 year        7/14/2025  Eulalia Garcia MD

## 2025-07-18 ENCOUNTER — TELEPHONE (OUTPATIENT)
Facility: CLINIC | Age: 60
End: 2025-07-18

## 2025-07-18 DIAGNOSIS — E06.3 HYPOTHYROIDISM DUE TO HASHIMOTO'S THYROIDITIS: Primary | ICD-10-CM

## 2025-07-18 NOTE — TELEPHONE ENCOUNTER
There are existing active labs for patient to be able to complete in 6 weeks.      Doctor Jose,  Please see bellregina.  Any other recommendations for her sleep? Should patient consult with her Primary Care Provider?  Is there any Levothyroxine dosage in between 75 and 88 mCg you would want her to try?  Please advise,  Thank you.    Patient was explained Doctor Jose's note and what Mg+ is - stated understanding, but Patient did look on Side Effects and is worried about High blood pressure and Osteoporosis. Confirms she sometimes takes melatonin. Asks for alternative.    Patient states she feels the dosage change was great and is asking if there is anything in between Levothyroxine 75 (used to take) and 88mCg (currently prescribed).

## 2025-07-18 NOTE — TELEPHONE ENCOUNTER
Patient calling regards still having trouble sleeping these past nights after skipping a day from dosage from medication as recommended. Please call.

## 2025-07-18 NOTE — TELEPHONE ENCOUNTER
There is no in between dose.  She could alternate the 75 and 88 every other day if she prefers instead of skipping one day per week.  In terms of sleep I would agree with plan to see PCP.  She also needs to give the thyroid change some time - it hasn't changed at all in 3 days.

## 2025-07-18 NOTE — TELEPHONE ENCOUNTER
Agree with plan to recheck labs in 6 weeks.  It will take time for that change to impact sleep.  Honestly it hasn't really started to take effect.  She could try taking a dose a magnesium at night to help with sleep.

## 2025-07-18 NOTE — TELEPHONE ENCOUNTER
Left message to call back for further information. Per TE from 5/21/25 she reported insomnia on levothyroxine. At the time, dose adjustment was not recommended based on recent labs. Per Last office visit note 7/14/25, dose was adjusted as follows:        ASSESSMENT/PLAN:     Hypothyroidism  - Discussed importance of long term treatment  - Decrease Levothyroxine 88mcg 6 days per week   - Discussed taking in AM and waiting 30-60 min before eating  - Recheck TSH, FT4 in 2 months   -Thyroid US negative for nodules, small gland 2021   - Discussed decreasing CHO amount and calories for weight loss  - Refer to RD      RTC 1 year     Typically it can take 4-6 weeks for lab levels to change after a dose adjustment. Dr. Garcia, any further recommendations for the patient's continued insomnia?

## 2025-08-03 PROBLEM — F41.9 ANXIETY: Status: ACTIVE | Noted: 2017-03-20

## 2025-08-03 PROBLEM — Z00.00 ROUTINE HEALTH MAINTENANCE: Status: ACTIVE | Noted: 2018-11-15

## 2025-08-03 PROBLEM — R73.01 IMPAIRED FASTING GLUCOSE: Status: ACTIVE | Noted: 2017-03-22

## 2025-08-03 PROBLEM — M85.88 OSTEOPENIA OF LUMBAR SPINE: Status: ACTIVE | Noted: 2020-12-09

## 2025-08-03 PROBLEM — R73.03 PREDIABETES: Status: ACTIVE | Noted: 2020-12-09

## 2025-08-03 PROBLEM — Z90.710 ACQUIRED ABSENCE OF BOTH CERVIX AND UTERUS: Status: ACTIVE | Noted: 2017-11-06

## 2025-08-03 PROBLEM — E53.8 VITAMIN B12 DEFICIENCY: Status: ACTIVE | Noted: 2023-10-12

## 2025-08-03 PROBLEM — F51.01 PRIMARY INSOMNIA: Status: ACTIVE | Noted: 2017-03-20

## 2025-08-03 PROBLEM — E78.00 HYPERCHOLESTEROLEMIA: Status: ACTIVE | Noted: 2020-12-09

## 2025-08-04 ENCOUNTER — HOSPITAL ENCOUNTER (OUTPATIENT)
Age: 60
Discharge: HOME | End: 2025-08-04

## 2025-08-04 VITALS
OXYGEN SATURATION: 100 % | RESPIRATION RATE: 18 BRPM | DIASTOLIC BLOOD PRESSURE: 58 MMHG | TEMPERATURE: 99 F | SYSTOLIC BLOOD PRESSURE: 112 MMHG | HEART RATE: 64 BPM

## 2025-08-04 DIAGNOSIS — T14.8XXA SKIN AVULSION: Primary | ICD-10-CM

## 2025-08-04 PROCEDURE — 99202 OFFICE O/P NEW SF 15 MIN: CPT | Performed by: NURSE PRACTITIONER

## 2025-08-05 ENCOUNTER — APPOINTMENT (OUTPATIENT)
Dept: INTERNAL MEDICINE | Age: 60
End: 2025-08-05

## 2025-08-05 VITALS
HEART RATE: 65 BPM | HEIGHT: 64 IN | DIASTOLIC BLOOD PRESSURE: 86 MMHG | SYSTOLIC BLOOD PRESSURE: 127 MMHG | OXYGEN SATURATION: 97 % | BODY MASS INDEX: 23.46 KG/M2 | TEMPERATURE: 97.6 F | WEIGHT: 137.4 LBS

## 2025-08-05 DIAGNOSIS — E53.8 VITAMIN B12 DEFICIENCY: ICD-10-CM

## 2025-08-05 DIAGNOSIS — R73.01 IMPAIRED FASTING GLUCOSE: ICD-10-CM

## 2025-08-05 DIAGNOSIS — E78.00 HYPERCHOLESTEROLEMIA: ICD-10-CM

## 2025-08-05 DIAGNOSIS — E03.8 OTHER SPECIFIED HYPOTHYROIDISM: ICD-10-CM

## 2025-08-05 DIAGNOSIS — Z00.00 ROUTINE HEALTH MAINTENANCE: ICD-10-CM

## 2025-08-05 DIAGNOSIS — M85.88 OSTEOPENIA OF LUMBAR SPINE: ICD-10-CM

## 2025-08-05 DIAGNOSIS — R73.03 PREDIABETES: ICD-10-CM

## 2025-08-05 DIAGNOSIS — Z78.0 POSTMENOPAUSE: Primary | ICD-10-CM

## 2025-08-05 DIAGNOSIS — Z12.31 ENCOUNTER FOR SCREENING MAMMOGRAM FOR MALIGNANT NEOPLASM OF BREAST: ICD-10-CM

## 2025-08-05 ASSESSMENT — PATIENT HEALTH QUESTIONNAIRE - PHQ9
SUM OF ALL RESPONSES TO PHQ9 QUESTIONS 1 AND 2: 1
2. FEELING DOWN, DEPRESSED OR HOPELESS: NOT AT ALL
1. LITTLE INTEREST OR PLEASURE IN DOING THINGS: SEVERAL DAYS
CLINICAL INTERPRETATION OF PHQ2 SCORE: NO FURTHER SCREENING NEEDED
SUM OF ALL RESPONSES TO PHQ9 QUESTIONS 1 AND 2: 1

## 2025-08-23 ENCOUNTER — LAB ENCOUNTER (OUTPATIENT)
Dept: LAB | Facility: HOSPITAL | Age: 60
End: 2025-08-23
Attending: INTERNAL MEDICINE

## 2025-08-23 ENCOUNTER — EXTERNAL LAB (OUTPATIENT)
Dept: HEALTH INFORMATION MANAGEMENT | Facility: OTHER | Age: 60
End: 2025-08-23

## 2025-08-23 DIAGNOSIS — E06.3 HYPOTHYROIDISM DUE TO HASHIMOTO'S THYROIDITIS: ICD-10-CM

## 2025-08-23 LAB
T4 FREE SERPL-MCNC: 1.9 NG/DL (ref 0.8–1.7)
T4 FREE SERPL-MCNC: 1.9 NG/DL (ref 0.8–1.7)
TSH SERPL-ACNC: 0.03 UIU/ML (ref 0.55–4.78)
TSI SER-ACNC: 0.03 UIU/ML (ref 0.55–4.78)

## 2025-08-23 PROCEDURE — 84439 ASSAY OF FREE THYROXINE: CPT

## 2025-08-23 PROCEDURE — 84443 ASSAY THYROID STIM HORMONE: CPT

## 2025-08-23 PROCEDURE — 36415 COLL VENOUS BLD VENIPUNCTURE: CPT

## 2025-08-25 ENCOUNTER — TELEPHONE (OUTPATIENT)
Dept: ENDOCRINOLOGY CLINIC | Facility: CLINIC | Age: 60
End: 2025-08-25

## 2025-08-25 DIAGNOSIS — E06.3 HYPOTHYROIDISM DUE TO HASHIMOTO'S THYROIDITIS: Primary | ICD-10-CM

## 2025-08-26 RX ORDER — LEVOTHYROXINE SODIUM 75 UG/1
75 TABLET ORAL
Qty: 90 TABLET | Refills: 0 | Status: SHIPPED | OUTPATIENT
Start: 2025-08-26

## 2025-10-07 ENCOUNTER — APPOINTMENT (OUTPATIENT)
Dept: INTERNAL MEDICINE | Age: 60
End: 2025-10-07